# Patient Record
Sex: FEMALE | Race: AMERICAN INDIAN OR ALASKA NATIVE | NOT HISPANIC OR LATINO | Employment: UNEMPLOYED | URBAN - METROPOLITAN AREA
[De-identification: names, ages, dates, MRNs, and addresses within clinical notes are randomized per-mention and may not be internally consistent; named-entity substitution may affect disease eponyms.]

---

## 2023-03-13 PROBLEM — N85.01 ENDOMETRIAL HYPERPLASIA WITHOUT ATYPIA, COMPLEX: Status: ACTIVE | Noted: 2023-03-13

## 2023-03-17 ENCOUNTER — TELEPHONE (OUTPATIENT)
Dept: GYNECOLOGIC ONCOLOGY | Facility: CLINIC | Age: 43
End: 2023-03-17
Payer: MEDICAID

## 2023-03-17 NOTE — TELEPHONE ENCOUNTER
Spoke with our patient about her insurance, schedule appointment she voiced understanding of the date, time and location. All questions answered appointment mail. Provider Scheduling Coord.  Gynecologic Oncology MA/PAR /Preceptor Salomon Stewart

## 2023-03-20 PROBLEM — C54.1 ENDOMETRIAL CANCER: Status: ACTIVE | Noted: 2023-03-20

## 2023-03-20 NOTE — PROGRESS NOTES
Referring Provider:  Elysia Garrido MD  [address] [place] [id number]   Subjective:      Patient ID: Kristi Coronado is a 42 y.o. female.    Chief Complaint: Advice Only (New patient )    Problem List Items Addressed This Visit          Oncology    Endometrial cancer    Overview     23: EMB with fragmented endometrioid proliferation, probably representing endometrioid carcinoma (FIGO G1) and complex atypical hyperplasia. US with 12.7 mm EMS         Relevant Orders    Ambulatory referral/consult to Cardiology     Other Visit Diagnoses       History of MI (myocardial infarction)    -  Primary    Relevant Orders    Ambulatory referral/consult to Cardiology    Class 3 severe obesity with body mass index (BMI) of 40.0 to 44.9 in adult, unspecified obesity type, unspecified whether serious comorbidity present        Relevant Orders    Ambulatory referral/consult to Cardiology           HPI Reports long history of AUB. Recent CT for back pain showed thickened endometrial cavity. Subsequent US showed 12.7mm EMS.     Reports 150 lb intentional weight loss from her maximum weight.    Reports 3 heart attacks from 5252-9231 and underwent angiography but no stents. Not currently established with a cardiologist.     Review of Systems   Constitutional:  Negative for chills, fatigue and fever.   Respiratory:  Negative for cough and shortness of breath.    Cardiovascular:  Negative for chest pain.   Gastrointestinal:  Negative for abdominal distention, abdominal pain, constipation and diarrhea.   Genitourinary:  Positive for vaginal bleeding. Negative for dysuria and pelvic pain.   Musculoskeletal:  Negative for back pain.   Psychiatric/Behavioral:  Negative for dysphoric mood. The patient is not nervous/anxious.    Past Medical History:   Diagnosis Date    Back pain     Heart attack       Past Surgical History:   Procedure Laterality Date     SECTION      GALLBLADDER SURGERY      TUBAL LIGATION         History reviewed. No pertinent family history.   Social History     Socioeconomic History    Marital status: Single        Objective:      Vitals:    03/23/23 0953   BP: 126/71   Pulse: 89      Physical Exam  Constitutional:       General: She is not in acute distress.  HENT:      Head: Normocephalic.   Eyes:      Extraocular Movements: Extraocular movements intact.      Conjunctiva/sclera: Conjunctivae normal.   Cardiovascular:      Rate and Rhythm: Normal rate.      Pulses: Normal pulses.   Pulmonary:      Effort: Pulmonary effort is normal. No respiratory distress.      Breath sounds: No wheezing.   Abdominal:      General: There is no distension.      Tenderness: There is no abdominal tenderness. There is no guarding or rebound.      Comments: Pfannenstiel scar and laparoscopic scars from cholecystectomy   Genitourinary:     Comments: External genitalia normal. Vagina normal. Cervix with no visible lesions. Uterus mobile.  Exam limited by body habitus.     Musculoskeletal:         General: No deformity.   Neurological:      Mental Status: She is alert and oriented to person, place, and time.   Psychiatric:         Mood and Affect: Mood normal.         Behavior: Behavior normal.         Thought Content: Thought content normal.       No results found for: WBC, HGB, HCT, MCV, PLT     Assessment:       History of MI (myocardial infarction)  -     Ambulatory referral/consult to Cardiology; Future; Expected date: 03/30/2023    Endometrial cancer  -     Ambulatory referral/consult to Cardiology; Future; Expected date: 03/30/2023    Class 3 severe obesity with body mass index (BMI) of 40.0 to 44.9 in adult, unspecified obesity type, unspecified whether serious comorbidity present  -     Ambulatory referral/consult to Cardiology; Future; Expected date: 03/30/2023         Plan:       Endometrial cancer: Prior work up showed EMB with G1 endometrioid carcinoma and CAH as well as a 12.7 mm EMS . I had an extensive  conversation with the patient today giving a broad overview of endometrial cancer to include epidemiology, risk factors, clinical features, diagnosis, as well as surgical treatment.  I have recommended robotic-assisted hysterectomy, bilateral salpingo-oophorectomy and sentinel lymph node mapping and biopsy. Discussed role of systematic lymphadenectomy if no mapping.  Based on the data from surgery we will determine whether adjuvant therapy would be recommended. I discussed extensively the risks, benefits, alternatives, and indications of the planned procedure to include the risk of damage to bowel, bladder, ureter, or any other abdominal or pelvic organ as well as the risks of conversion to a laparotomy. Additionally, she understands the surgical risks of infection, allergic reaction, bleeding possibly severe enough to require blood transfusion, blood clots, and cardiopulmonary complications.  She expresses understanding, was given an opportunity to ask questions. After all questions had been answered she strongly desires to proceed with planned procedure.  Plan for Robotic hysterectomy, BSO, and SLN biopsy  4/26    2. Morbid obesity: BMI 44. Reports past success with 150lb weight loss from max weight of 400 lbs. Reviewed the potentially causal relationship between obesity and CAH/endometrial cancer. Encouraged continued weight loss efforts.    3. History of myocardial infarction: urgent refer to cardiology to establish ongoing care and preoperative risk assessment and optimization with an effort to expedite evaluation and work up prior to planned surgery on 4/26.    As part of the medical decision making process I reviewed the referring provides notes, relevant labs, imaging reports. I spent 60 minutes of face to face counseling, care coordination, and records review related to today's encounter.    .      Prasad Hathaway MD

## 2023-03-23 ENCOUNTER — OFFICE VISIT (OUTPATIENT)
Dept: GYNECOLOGIC ONCOLOGY | Facility: CLINIC | Age: 43
End: 2023-03-23
Payer: MEDICAID

## 2023-03-23 ENCOUNTER — TELEPHONE (OUTPATIENT)
Dept: GYNECOLOGIC ONCOLOGY | Facility: CLINIC | Age: 43
End: 2023-03-23
Payer: MEDICAID

## 2023-03-23 VITALS
HEART RATE: 89 BPM | HEIGHT: 62 IN | BODY MASS INDEX: 44.99 KG/M2 | SYSTOLIC BLOOD PRESSURE: 126 MMHG | WEIGHT: 244.5 LBS | DIASTOLIC BLOOD PRESSURE: 71 MMHG

## 2023-03-23 DIAGNOSIS — N85.02 COMPLEX ENDOMETRIAL HYPERPLASIA WITH ATYPIA: ICD-10-CM

## 2023-03-23 DIAGNOSIS — I25.2 HISTORY OF MI (MYOCARDIAL INFARCTION): ICD-10-CM

## 2023-03-23 DIAGNOSIS — C54.1 ENDOMETRIAL CANCER: Primary | ICD-10-CM

## 2023-03-23 DIAGNOSIS — E66.01 CLASS 3 SEVERE OBESITY WITH BODY MASS INDEX (BMI) OF 40.0 TO 44.9 IN ADULT, UNSPECIFIED OBESITY TYPE, UNSPECIFIED WHETHER SERIOUS COMORBIDITY PRESENT: ICD-10-CM

## 2023-03-23 DIAGNOSIS — E66.01 CLASS 3 SEVERE OBESITY DUE TO EXCESS CALORIES WITH SERIOUS COMORBIDITY AND BODY MASS INDEX (BMI) OF 40.0 TO 44.9 IN ADULT: ICD-10-CM

## 2023-03-23 DIAGNOSIS — I25.2 HISTORY OF MYOCARDIAL INFARCTION: ICD-10-CM

## 2023-03-23 DIAGNOSIS — C54.1 ENDOMETRIAL CARCINOMA: ICD-10-CM

## 2023-03-23 PROBLEM — E66.813 CLASS 3 SEVERE OBESITY WITH BODY MASS INDEX (BMI) OF 40.0 TO 44.9 IN ADULT: Status: ACTIVE | Noted: 2023-03-23

## 2023-03-23 PROCEDURE — 99205 PR OFFICE/OUTPT VISIT, NEW, LEVL V, 60-74 MIN: ICD-10-PCS | Mod: S$PBB,,, | Performed by: STUDENT IN AN ORGANIZED HEALTH CARE EDUCATION/TRAINING PROGRAM

## 2023-03-23 PROCEDURE — 99999 PR PBB SHADOW E&M-EST. PATIENT-LVL III: ICD-10-PCS | Mod: PBBFAC,,, | Performed by: STUDENT IN AN ORGANIZED HEALTH CARE EDUCATION/TRAINING PROGRAM

## 2023-03-23 PROCEDURE — 99213 OFFICE O/P EST LOW 20 MIN: CPT | Mod: PBBFAC | Performed by: STUDENT IN AN ORGANIZED HEALTH CARE EDUCATION/TRAINING PROGRAM

## 2023-03-23 PROCEDURE — 3074F SYST BP LT 130 MM HG: CPT | Mod: CPTII,,, | Performed by: STUDENT IN AN ORGANIZED HEALTH CARE EDUCATION/TRAINING PROGRAM

## 2023-03-23 PROCEDURE — 99205 OFFICE O/P NEW HI 60 MIN: CPT | Mod: S$PBB,,, | Performed by: STUDENT IN AN ORGANIZED HEALTH CARE EDUCATION/TRAINING PROGRAM

## 2023-03-23 PROCEDURE — 99999 PR PBB SHADOW E&M-EST. PATIENT-LVL III: CPT | Mod: PBBFAC,,, | Performed by: STUDENT IN AN ORGANIZED HEALTH CARE EDUCATION/TRAINING PROGRAM

## 2023-03-23 PROCEDURE — 1159F PR MEDICATION LIST DOCUMENTED IN MEDICAL RECORD: ICD-10-PCS | Mod: CPTII,,, | Performed by: STUDENT IN AN ORGANIZED HEALTH CARE EDUCATION/TRAINING PROGRAM

## 2023-03-23 PROCEDURE — 3008F BODY MASS INDEX DOCD: CPT | Mod: CPTII,,, | Performed by: STUDENT IN AN ORGANIZED HEALTH CARE EDUCATION/TRAINING PROGRAM

## 2023-03-23 PROCEDURE — 3008F PR BODY MASS INDEX (BMI) DOCUMENTED: ICD-10-PCS | Mod: CPTII,,, | Performed by: STUDENT IN AN ORGANIZED HEALTH CARE EDUCATION/TRAINING PROGRAM

## 2023-03-23 PROCEDURE — 3074F PR MOST RECENT SYSTOLIC BLOOD PRESSURE < 130 MM HG: ICD-10-PCS | Mod: CPTII,,, | Performed by: STUDENT IN AN ORGANIZED HEALTH CARE EDUCATION/TRAINING PROGRAM

## 2023-03-23 PROCEDURE — 1159F MED LIST DOCD IN RCRD: CPT | Mod: CPTII,,, | Performed by: STUDENT IN AN ORGANIZED HEALTH CARE EDUCATION/TRAINING PROGRAM

## 2023-03-23 PROCEDURE — 3078F DIAST BP <80 MM HG: CPT | Mod: CPTII,,, | Performed by: STUDENT IN AN ORGANIZED HEALTH CARE EDUCATION/TRAINING PROGRAM

## 2023-03-23 PROCEDURE — 3078F PR MOST RECENT DIASTOLIC BLOOD PRESSURE < 80 MM HG: ICD-10-PCS | Mod: CPTII,,, | Performed by: STUDENT IN AN ORGANIZED HEALTH CARE EDUCATION/TRAINING PROGRAM

## 2023-03-23 RX ORDER — CEFAZOLIN SODIUM 2 G/50ML
2 SOLUTION INTRAVENOUS
Status: CANCELLED | OUTPATIENT
Start: 2023-03-23

## 2023-03-23 RX ORDER — LIDOCAINE HYDROCHLORIDE 10 MG/ML
1 INJECTION, SOLUTION EPIDURAL; INFILTRATION; INTRACAUDAL; PERINEURAL ONCE
Status: CANCELLED | OUTPATIENT
Start: 2023-03-23 | End: 2023-03-23

## 2023-03-23 RX ORDER — PREDNISONE 20 MG/1
20 TABLET ORAL
COMMUNITY
Start: 2023-01-05 | End: 2023-04-14 | Stop reason: ALTCHOICE

## 2023-03-23 RX ORDER — TIZANIDINE 2 MG/1
2 TABLET ORAL 3 TIMES DAILY
COMMUNITY
Start: 2023-01-05 | End: 2023-04-14 | Stop reason: ALTCHOICE

## 2023-03-23 RX ORDER — HEPARIN SODIUM 5000 [USP'U]/ML
5000 INJECTION, SOLUTION INTRAVENOUS; SUBCUTANEOUS EVERY 8 HOURS
Status: CANCELLED | OUTPATIENT
Start: 2023-03-23

## 2023-03-23 RX ORDER — GABAPENTIN 300 MG/1
300 CAPSULE ORAL NIGHTLY
COMMUNITY
Start: 2023-01-09 | End: 2023-04-14 | Stop reason: ALTCHOICE

## 2023-03-23 RX ORDER — METHYLPREDNISOLONE 4 MG/1
TABLET ORAL
COMMUNITY
Start: 2023-01-09 | End: 2023-04-14 | Stop reason: ALTCHOICE

## 2023-03-23 RX ORDER — HYDROCODONE BITARTRATE AND ACETAMINOPHEN 7.5; 325 MG/1; MG/1
1 TABLET ORAL 3 TIMES DAILY
COMMUNITY
Start: 2023-01-24 | End: 2023-04-14 | Stop reason: ALTCHOICE

## 2023-03-23 RX ORDER — AMOXICILLIN 500 MG/1
500 CAPSULE ORAL 3 TIMES DAILY
COMMUNITY
Start: 2023-01-24 | End: 2023-04-14 | Stop reason: ALTCHOICE

## 2023-03-23 RX ORDER — HYDROCODONE BITARTRATE AND ACETAMINOPHEN 5; 325 MG/1; MG/1
1 TABLET ORAL 4 TIMES DAILY PRN
COMMUNITY
Start: 2022-12-03 | End: 2023-04-14 | Stop reason: ALTCHOICE

## 2023-03-23 NOTE — Clinical Note
Dr. Garrido,  Thanks for sending Kristi to see me. She is a wonderful lady and is truly such a pleasure. I'm planning for surgery in the near future after she connects with cardiology for a preop assessment. I'll be sure to keep you updated on her course.   Please feel free to reach out any time with patient questions or referrals and I will always work to try to get patients in as quickly as possible.  Prasad Hathaway MD Cell: [phone]

## 2023-04-11 ENCOUNTER — TELEPHONE (OUTPATIENT)
Dept: CARDIOLOGY | Facility: CLINIC | Age: 43
End: 2023-04-11
Payer: MEDICAID

## 2023-04-11 NOTE — TELEPHONE ENCOUNTER
Tried sevral times calling patient. Voice message stating patient is not accepting phone calls at this time.

## 2023-04-11 NOTE — TELEPHONE ENCOUNTER
----- Message from Lemuel Samano sent at 4/11/2023 12:20 PM CDT -----  When you get a chance can you schedule this patient with one of the docs?     ----- Message -----  From: Maliha Gomez  Sent: 4/11/2023  12:17 PM CDT  To: , #    Name of Who is Calling: RAMÍREZ DALEY [[id number]]           What is the request in detail: Pt stated that she has an referral but would like to be seen soon as she has an upcoming surgery and is needing clearance from an cardiologist.Please contact to further discuss and advise.            Can the clinic reply by [facility]: NO           What Number to Call Back if not in [facility]:[phone]

## 2023-04-13 ENCOUNTER — OFFICE VISIT (OUTPATIENT)
Dept: CARDIOLOGY | Facility: CLINIC | Age: 43
End: 2023-04-13
Payer: MEDICAID

## 2023-04-13 VITALS
HEART RATE: 91 BPM | BODY MASS INDEX: 44.63 KG/M2 | OXYGEN SATURATION: 97 % | WEIGHT: 242.5 LBS | HEIGHT: 62 IN | RESPIRATION RATE: 18 BRPM | DIASTOLIC BLOOD PRESSURE: 76 MMHG | SYSTOLIC BLOOD PRESSURE: 118 MMHG

## 2023-04-13 DIAGNOSIS — I25.2 HISTORY OF MI (MYOCARDIAL INFARCTION): ICD-10-CM

## 2023-04-13 DIAGNOSIS — E66.01 CLASS 3 SEVERE OBESITY WITH BODY MASS INDEX (BMI) OF 40.0 TO 44.9 IN ADULT, UNSPECIFIED OBESITY TYPE, UNSPECIFIED WHETHER SERIOUS COMORBIDITY PRESENT: ICD-10-CM

## 2023-04-13 DIAGNOSIS — I25.2 HISTORY OF MI (MYOCARDIAL INFARCTION): Primary | ICD-10-CM

## 2023-04-13 DIAGNOSIS — C54.1 ENDOMETRIAL CANCER: Primary | ICD-10-CM

## 2023-04-13 DIAGNOSIS — I25.2 HISTORY OF MYOCARDIAL INFARCTION DUE TO DEMAND ISCHEMIA: ICD-10-CM

## 2023-04-13 PROCEDURE — 3078F PR MOST RECENT DIASTOLIC BLOOD PRESSURE < 80 MM HG: ICD-10-PCS | Mod: CPTII,,, | Performed by: NURSE PRACTITIONER

## 2023-04-13 PROCEDURE — 93010 ELECTROCARDIOGRAM REPORT: CPT | Mod: S$PBB,,, | Performed by: INTERNAL MEDICINE

## 2023-04-13 PROCEDURE — 99204 PR OFFICE/OUTPT VISIT, NEW, LEVL IV, 45-59 MIN: ICD-10-PCS | Mod: S$PBB,,, | Performed by: NURSE PRACTITIONER

## 2023-04-13 PROCEDURE — 99204 OFFICE O/P NEW MOD 45 MIN: CPT | Mod: S$PBB,,, | Performed by: NURSE PRACTITIONER

## 2023-04-13 PROCEDURE — 3008F BODY MASS INDEX DOCD: CPT | Mod: CPTII,,, | Performed by: NURSE PRACTITIONER

## 2023-04-13 PROCEDURE — 1159F MED LIST DOCD IN RCRD: CPT | Mod: CPTII,,, | Performed by: NURSE PRACTITIONER

## 2023-04-13 PROCEDURE — 99999 PR PBB SHADOW E&M-EST. PATIENT-LVL III: ICD-10-PCS | Mod: PBBFAC,,, | Performed by: NURSE PRACTITIONER

## 2023-04-13 PROCEDURE — 3078F DIAST BP <80 MM HG: CPT | Mod: CPTII,,, | Performed by: NURSE PRACTITIONER

## 2023-04-13 PROCEDURE — 3074F PR MOST RECENT SYSTOLIC BLOOD PRESSURE < 130 MM HG: ICD-10-PCS | Mod: CPTII,,, | Performed by: NURSE PRACTITIONER

## 2023-04-13 PROCEDURE — 3008F PR BODY MASS INDEX (BMI) DOCUMENTED: ICD-10-PCS | Mod: CPTII,,, | Performed by: NURSE PRACTITIONER

## 2023-04-13 PROCEDURE — 93010 EKG 12-LEAD: ICD-10-PCS | Mod: S$PBB,,, | Performed by: INTERNAL MEDICINE

## 2023-04-13 PROCEDURE — 3074F SYST BP LT 130 MM HG: CPT | Mod: CPTII,,, | Performed by: NURSE PRACTITIONER

## 2023-04-13 PROCEDURE — 99213 OFFICE O/P EST LOW 20 MIN: CPT | Mod: PBBFAC | Performed by: NURSE PRACTITIONER

## 2023-04-13 PROCEDURE — 1159F PR MEDICATION LIST DOCUMENTED IN MEDICAL RECORD: ICD-10-PCS | Mod: CPTII,,, | Performed by: NURSE PRACTITIONER

## 2023-04-13 PROCEDURE — 93005 ELECTROCARDIOGRAM TRACING: CPT | Mod: PBBFAC | Performed by: INTERNAL MEDICINE

## 2023-04-13 PROCEDURE — 99999 PR PBB SHADOW E&M-EST. PATIENT-LVL III: CPT | Mod: PBBFAC,,, | Performed by: NURSE PRACTITIONER

## 2023-04-13 NOTE — PROGRESS NOTES
[facility]    CC: Medical clearance/ Establish care    Patient ID: Kristi Coronado is a 42 y.o. female with a past medical history of MI.      HPI  Patient reports extensive cardiac history of 3 heart attacks from 9866-0423 and underwent angiography but did not require stenting   No medical records per review    She tells me she discontinued all recommended cardiac medication, including statin, BB, plavix, ACE-I; states she feels better when she does not take medications   Tells me she has stress testing in the past  Continues to smoke  No active    Has upcoming non-cardiac procedure scheduled  for hysterectomy for endometrial CA    Reports chronic chest pain, unchanged in nature since   Denies syncope, pre-syncope, orthopnea, PND, palpitations     Past Medical History:   Diagnosis Date    Back pain     Heart attack      Past Surgical History:   Procedure Laterality Date     SECTION      GALLBLADDER SURGERY      TUBAL LIGATION       Social History     Socioeconomic History    Marital status: Single   Tobacco Use    Smoking status: Former     Packs/day: 1.50     Years: 15.00     Pack years: 22.50     Types: Cigarettes   Substance and Sexual Activity    Alcohol use: Never    Drug use: Never    Sexual activity: Yes     Partners: Male     Birth control/protection: None     No family history on file.    Review of patient's allergies indicates:  No Known Allergies    Medication List with Changes/Refills   Current Medications    AMOXICILLIN (AMOXIL) 500 MG CAPSULE    Take 500 mg by mouth 3 (three) times daily.    GABAPENTIN (NEURONTIN) 300 MG CAPSULE    Take 300 mg by mouth every evening.    HYDROCODONE-ACETAMINOPHEN (NORCO) 5-325 MG PER TABLET    Take 1 tablet by mouth 4 (four) times daily as needed.    HYDROCODONE-ACETAMINOPHEN (NORCO) 7.5-325 MG PER TABLET    Take 1 tablet by mouth 3 (three) times daily.    METHYLPREDNISOLONE (MEDROL DOSEPACK) 4 MG TABLET    Take by mouth.     "PREDNISONE (DELTASONE) 20 MG TABLET    Take 20 mg by mouth.    TIZANIDINE (ZANAFLEX) 2 MG TABLET    Take 2 mg by mouth 3 (three) times daily.           Review of Systems   Constitutional: Negative.   Cardiovascular: Negative.    Respiratory: Negative.     Skin: Negative.    Musculoskeletal:  Positive for muscle weakness.     Vitals:    04/13/23 1056   BP: 118/76   Pulse: 91   Resp: 18   SpO2: 97%   Weight: 110 kg (242 lb 8.1 oz)   Height: 5' 2" (1.575 m)          Physical Exam  Constitutional:       Appearance: She is obese.   HENT:      Head: Normocephalic.   Cardiovascular:      Rate and Rhythm: Normal rate and regular rhythm.      Pulses: Normal pulses.      Heart sounds: Normal heart sounds.   Pulmonary:      Effort: Pulmonary effort is normal.      Breath sounds: Normal breath sounds.   Musculoskeletal:         General: Normal range of motion.   Skin:     General: Skin is warm and dry.   Neurological:      Mental Status: She is alert and oriented to person, place, and time.   Psychiatric:         Mood and Affect: Mood normal.         Labs:  Most Recent Data  CBC: No results found for: WBC, HGB, HCT, PLT, MCV, RDW  BMP: No results found for: NA, K, CL, CO2, BUN, CREATININE, GLU, CALCIUM, MG, PHOS  LFTS; No results found for: PROT, ALBUMIN, BILITOT, AST, ALKPHOS, ALT, GGT  COAGS: No results found for: INR, PROTIME, PTT  FLP: No results found for: CHOL, HDL, LDLCALC, TRIG, CHOLHDL  CARDIAC: No results found for: TROPONINI, CKMB, BNP    Assessment/Plan:  Problem List Items Addressed This Visit          Oncology    Endometrial cancer    Overview     2/23/23: EMB with fragmented endometrioid proliferation, probably representing endometrioid carcinoma (FIGO G1) and complex atypical hyperplasia. US with 12.7 mm EMS              Endocrine    Class 3 severe obesity with body mass index (BMI) of 40.0 to 44.9 in adult     Other Visit Diagnoses       History of MI (myocardial infarction)              Asymptomatic   EKG NSR, " no infarct noted, no ST changes   Stable from cardiac standpoint given today's exam, however given history would like medical records prior to clearance.     Patient reports recent, extensive cardiac workup including angiogram from [facility]-     Please get medical records from [facility] and [facility]   Will complete clearance thereafter    Encourage smoke cessation           ADDENDUM 4/25/2023  updated non-obstructive CAD, 30% stenosing to ramus and LAD, EF normal, grade 1 DD  Hx NSTEMI type II 2021- trop 1.0    Aggressive medical mgt  Was discharged with ASA, Lipitor 40, lopressor 12.5 QID, lisinopril 5mg per [facility] records; patient stopped all meds as she reports they were making her feel bad; will reassess at next visit    Patient is at acceptable risk for upcoming noncardiac surgery. Able to preform a min of 4 METS      Total duration of face to face visit time 30 minutes.  Total time spent counseling greater than fifty percent of total visit time.  Counseling included discussion regarding imaging findings, diagnosis, possibilities, treatment options, risks and benefits.  The patient had many questions regarding the options and long-term effects.    Mena Guthrie, EDGARDOP-C  Cardiology Clinic  [facility]

## 2023-04-14 ENCOUNTER — TELEPHONE (OUTPATIENT)
Dept: CARDIOLOGY | Facility: CLINIC | Age: 43
End: 2023-04-14
Payer: MEDICAID

## 2023-04-14 NOTE — TELEPHONE ENCOUNTER
----- Message from Mena Guthrie, NP sent at 4/14/2023  2:47 PM CDT -----  Kailash Augustine,     Can we please follow up on her medical release from [facility] and [facility]?    Mena Guthrie

## 2023-04-18 ENCOUNTER — HOSPITAL ENCOUNTER (OUTPATIENT)
Dept: PREADMISSION TESTING | Facility: OTHER | Age: 43
Discharge: HOME | End: 2023-04-18
Attending: STUDENT IN AN ORGANIZED HEALTH CARE EDUCATION/TRAINING PROGRAM
Payer: MEDICAID

## 2023-04-18 ENCOUNTER — ANESTHESIA EVENT (OUTPATIENT)
Dept: SURGERY | Facility: OTHER | Age: 43
End: 2023-04-18
Payer: MEDICAID

## 2023-04-18 VITALS
DIASTOLIC BLOOD PRESSURE: 84 MMHG | SYSTOLIC BLOOD PRESSURE: 137 MMHG | OXYGEN SATURATION: 96 % | HEART RATE: 89 BPM | HEIGHT: 62 IN | BODY MASS INDEX: 44.53 KG/M2 | WEIGHT: 242 LBS

## 2023-04-18 DIAGNOSIS — I25.2 HISTORY OF MI (MYOCARDIAL INFARCTION): ICD-10-CM

## 2023-04-18 DIAGNOSIS — C54.1 ENDOMETRIAL CANCER: ICD-10-CM

## 2023-04-18 DIAGNOSIS — E66.01 CLASS 3 SEVERE OBESITY WITH BODY MASS INDEX (BMI) OF 40.0 TO 44.9 IN ADULT, UNSPECIFIED OBESITY TYPE, UNSPECIFIED WHETHER SERIOUS COMORBIDITY PRESENT: ICD-10-CM

## 2023-04-18 LAB
ABO + RH BLD: NORMAL
ALBUMIN SERPL BCP-MCNC: 4 G/DL (ref 3.5–5.2)
ALP SERPL-CCNC: 76 U/L (ref 55–135)
ALT SERPL W/O P-5'-P-CCNC: 18 U/L (ref 10–44)
ANION GAP SERPL CALC-SCNC: 8 MMOL/L (ref 8–16)
AST SERPL-CCNC: 16 U/L (ref 10–40)
BASOPHILS # BLD AUTO: 0.05 K/UL (ref 0–0.2)
BASOPHILS NFR BLD: 0.3 % (ref 0–1.9)
BILIRUB SERPL-MCNC: 0.2 MG/DL (ref 0.1–1)
BLD GP AB SCN CELLS X3 SERPL QL: NORMAL
BUN SERPL-MCNC: 11 MG/DL (ref 6–20)
CALCIUM SERPL-MCNC: 10.3 MG/DL (ref 8.7–10.5)
CHLORIDE SERPL-SCNC: 102 MMOL/L (ref 95–110)
CO2 SERPL-SCNC: 25 MMOL/L (ref 23–29)
CREAT SERPL-MCNC: 0.7 MG/DL (ref 0.5–1.4)
DIFFERENTIAL METHOD: ABNORMAL
EOSINOPHIL # BLD AUTO: 0.7 K/UL (ref 0–0.5)
EOSINOPHIL NFR BLD: 4.4 % (ref 0–8)
ERYTHROCYTE [DISTWIDTH] IN BLOOD BY AUTOMATED COUNT: 12.8 % (ref 11.5–14.5)
EST. GFR  (NO RACE VARIABLE): >60 ML/MIN/1.73 M^2
GLUCOSE SERPL-MCNC: 169 MG/DL (ref 70–110)
HCT VFR BLD AUTO: 48.5 % (ref 37–48.5)
HGB BLD-MCNC: 16.1 G/DL (ref 12–16)
IMM GRANULOCYTES # BLD AUTO: 0.05 K/UL (ref 0–0.04)
IMM GRANULOCYTES NFR BLD AUTO: 0.3 % (ref 0–0.5)
LYMPHOCYTES # BLD AUTO: 4.3 K/UL (ref 1–4.8)
LYMPHOCYTES NFR BLD: 28.6 % (ref 18–48)
MCH RBC QN AUTO: 29.7 PG (ref 27–31)
MCHC RBC AUTO-ENTMCNC: 33.2 G/DL (ref 32–36)
MCV RBC AUTO: 89 FL (ref 82–98)
MONOCYTES # BLD AUTO: 0.9 K/UL (ref 0.3–1)
MONOCYTES NFR BLD: 6.1 % (ref 4–15)
NEUTROPHILS # BLD AUTO: 9.2 K/UL (ref 1.8–7.7)
NEUTROPHILS NFR BLD: 60.3 % (ref 38–73)
NRBC BLD-RTO: 0 /100 WBC
PLATELET # BLD AUTO: 371 K/UL (ref 150–450)
PMV BLD AUTO: 9.3 FL (ref 9.2–12.9)
POTASSIUM SERPL-SCNC: 4.8 MMOL/L (ref 3.5–5.1)
PROT SERPL-MCNC: 7.7 G/DL (ref 6–8.4)
RBC # BLD AUTO: 5.43 M/UL (ref 4–5.4)
SODIUM SERPL-SCNC: 135 MMOL/L (ref 136–145)
SPECIMEN OUTDATE: NORMAL
WBC # BLD AUTO: 15.2 K/UL (ref 3.9–12.7)

## 2023-04-18 PROCEDURE — 85025 COMPLETE CBC W/AUTO DIFF WBC: CPT | Performed by: STUDENT IN AN ORGANIZED HEALTH CARE EDUCATION/TRAINING PROGRAM

## 2023-04-18 PROCEDURE — 80053 COMPREHEN METABOLIC PANEL: CPT | Performed by: STUDENT IN AN ORGANIZED HEALTH CARE EDUCATION/TRAINING PROGRAM

## 2023-04-18 PROCEDURE — 36415 COLL VENOUS BLD VENIPUNCTURE: CPT | Performed by: STUDENT IN AN ORGANIZED HEALTH CARE EDUCATION/TRAINING PROGRAM

## 2023-04-18 PROCEDURE — 86900 BLOOD TYPING SEROLOGIC ABO: CPT | Performed by: STUDENT IN AN ORGANIZED HEALTH CARE EDUCATION/TRAINING PROGRAM

## 2023-04-18 RX ORDER — LIDOCAINE HYDROCHLORIDE 10 MG/ML
1 INJECTION, SOLUTION EPIDURAL; INFILTRATION; INTRACAUDAL; PERINEURAL ONCE
Status: CANCELLED | OUTPATIENT
Start: 2023-04-18 | End: 2023-04-18

## 2023-04-18 RX ORDER — ALBUTEROL SULFATE 2.5 MG/.5ML
2.5 SOLUTION RESPIRATORY (INHALATION)
Status: CANCELLED | OUTPATIENT
Start: 2023-04-18 | End: 2023-04-18

## 2023-04-18 NOTE — ANESTHESIA PREPROCEDURE EVALUATION
04/18/2023  Silvana Coronado is a 42 y.o., female.      Pre-op Assessment    I have reviewed the Patient Summary Reports.     I have reviewed the Nursing Notes. I have reviewed the NPO Status.   I have reviewed the Medications.     Review of Systems  Anesthesia Hx:  No previous Anesthesia  Denies Family Hx of Anesthesia complications.   Denies Personal Hx of Anesthesia complications.   Social:  Smoker    Hematology/Oncology:  Hematology Normal      Current/Recent Cancer. Oncology Comments: Endometrial cancer    EENT/Dental:EENT/Dental Normal   Cardiovascular:   Past MI CAD   ECG has been reviewed. MI 2021,no stents  EKG NSR   Pulmonary:   Asthma asymptomatic    Renal/:  Renal/ Normal     Hepatic/GI:  Hepatic/GI Normal    Musculoskeletal:   Arthritis     Neurological:  Neurology Normal    Endocrine:  Endocrine Normal  Morbid Obesity / BMI > 40  Dermatological:  Skin Normal    Psych:  Psychiatric Normal           Physical Exam  General: Cooperative, Alert and Oriented    Airway:  Mallampati: II   Mouth Opening: Normal    Dental:  Periodontal disease  Missing teeth      Anesthesia Plan  Type of Anesthesia, risks & benefits discussed:    Anesthesia Type: Gen ETT  Intra-op Monitoring Plan: Standard ASA Monitors  Post Op Pain Control Plan: multimodal analgesia  Induction:  IV  Airway Plan: Video, Post-Induction  Informed Consent: Informed consent signed with the Patient and all parties understand the risks and agree with anesthesia plan.  All questions answered.   ASA Score: 3  Anesthesia Plan Notes: Awaiting cardiac clearance currently!  Pt still smoking in spite of CAD  Patient cleared by cardiology  NP in [facility]    Ready For Surgery From Anesthesia Perspective.     .

## 2023-04-24 ENCOUNTER — TELEPHONE (OUTPATIENT)
Dept: GYNECOLOGIC ONCOLOGY | Facility: CLINIC | Age: 43
End: 2023-04-24
Payer: MEDICAID

## 2023-04-24 NOTE — TELEPHONE ENCOUNTER
----- Message from Cyndi Wei sent at 4/24/2023  2:19 PM CDT -----  Contact: PAPO DALEY [[id number]]  Type: Call Back      Who called: PAPO DALEY [[id number]]      What is the request in detail: Patient is requesting a call back. Pt would like to know if her records has been received from [facility].  Please advise.     Can the clinic reply by [facility]? No      Would the patient rather a call back or a response via [facility]? Call back       Best call back number: [phone] (home)       Additional Information:

## 2023-04-25 DIAGNOSIS — E11.69 TYPE 2 DIABETES MELLITUS WITH OTHER SPECIFIED COMPLICATION, UNSPECIFIED WHETHER LONG TERM INSULIN USE: ICD-10-CM

## 2023-04-25 DIAGNOSIS — F17.200 CURRENT SMOKER: ICD-10-CM

## 2023-04-25 DIAGNOSIS — I25.2 HX OF NON-ST ELEVATION MYOCARDIAL INFARCTION (NSTEMI): Primary | ICD-10-CM

## 2023-04-25 NOTE — PROGRESS NOTES
Received medical records from [facility].    EKG 4/2023 NSR, no acute ST changes, no remote MI  [facility] 2021 non-obstructive disease  Hx NSTEMI  Echo with normal EF, grade 1 DD    Stable from cardiac standpoint and HF perspective. BP at goal.     Able to perform a min 4 METS. Patient at acceptable risk for upcoming non-cardiac procedure. She may follow up in clinic as scheduled.    MARTINA Guthrie NP

## 2023-04-26 ENCOUNTER — HOSPITAL ENCOUNTER (OUTPATIENT)
Facility: OTHER | Age: 43
LOS: 1 days | Discharge: HOME | End: 2023-04-26
Attending: STUDENT IN AN ORGANIZED HEALTH CARE EDUCATION/TRAINING PROGRAM | Admitting: STUDENT IN AN ORGANIZED HEALTH CARE EDUCATION/TRAINING PROGRAM
Payer: MEDICAID

## 2023-04-26 ENCOUNTER — ANESTHESIA (OUTPATIENT)
Dept: SURGERY | Facility: OTHER | Age: 43
End: 2023-04-26
Payer: MEDICAID

## 2023-04-26 DIAGNOSIS — C54.1 ENDOMETRIAL CARCINOMA: ICD-10-CM

## 2023-04-26 DIAGNOSIS — Z90.710 HISTORY OF ROBOT-ASSISTED LAPAROSCOPIC HYSTERECTOMY: Primary | ICD-10-CM

## 2023-04-26 DIAGNOSIS — I25.2 HISTORY OF MI (MYOCARDIAL INFARCTION): ICD-10-CM

## 2023-04-26 DIAGNOSIS — C54.1 ENDOMETRIAL CANCER: ICD-10-CM

## 2023-04-26 DIAGNOSIS — E66.01 CLASS 3 SEVERE OBESITY WITH BODY MASS INDEX (BMI) OF 40.0 TO 44.9 IN ADULT, UNSPECIFIED OBESITY TYPE, UNSPECIFIED WHETHER SERIOUS COMORBIDITY PRESENT: ICD-10-CM

## 2023-04-26 DIAGNOSIS — Z01.818 PRE-OP TESTING: ICD-10-CM

## 2023-04-26 LAB
B-HCG UR QL: NEGATIVE
CTP QC/QA: YES

## 2023-04-26 PROCEDURE — 71000039 HC RECOVERY, EACH ADD'L HOUR: Performed by: STUDENT IN AN ORGANIZED HEALTH CARE EDUCATION/TRAINING PROGRAM

## 2023-04-26 PROCEDURE — 38570 PR LAP,LYMPH NODE BX: ICD-10-PCS | Mod: AS,51,, | Performed by: NURSE PRACTITIONER

## 2023-04-26 PROCEDURE — 38900 IO MAP OF SENT LYMPH NODE: CPT | Mod: AS,50,, | Performed by: NURSE PRACTITIONER

## 2023-04-26 PROCEDURE — 36000713 HC OR TIME LEV V EA ADD 15 MIN: Performed by: STUDENT IN AN ORGANIZED HEALTH CARE EDUCATION/TRAINING PROGRAM

## 2023-04-26 PROCEDURE — 00840 ANES IPER PX LOWER ABD NOS: CPT | Performed by: STUDENT IN AN ORGANIZED HEALTH CARE EDUCATION/TRAINING PROGRAM

## 2023-04-26 PROCEDURE — 38900 PR INTRAOPERATIVE SENTINEL LYMPH NODE ID W DYE INJECTION: ICD-10-PCS | Mod: 50,,, | Performed by: STUDENT IN AN ORGANIZED HEALTH CARE EDUCATION/TRAINING PROGRAM

## 2023-04-26 PROCEDURE — 36000712 HC OR TIME LEV V 1ST 15 MIN: Performed by: STUDENT IN AN ORGANIZED HEALTH CARE EDUCATION/TRAINING PROGRAM

## 2023-04-26 PROCEDURE — 37000008 HC ANESTHESIA 1ST 15 MINUTES: Performed by: STUDENT IN AN ORGANIZED HEALTH CARE EDUCATION/TRAINING PROGRAM

## 2023-04-26 PROCEDURE — 27201423 OPTIME MED/SURG SUP & DEVICES STERILE SUPPLY: Performed by: STUDENT IN AN ORGANIZED HEALTH CARE EDUCATION/TRAINING PROGRAM

## 2023-04-26 PROCEDURE — 71000016 HC POSTOP RECOV ADDL HR: Performed by: STUDENT IN AN ORGANIZED HEALTH CARE EDUCATION/TRAINING PROGRAM

## 2023-04-26 PROCEDURE — 38570 LAPAROSCOPY LYMPH NODE BIOP: CPT | Mod: AS,51,, | Performed by: NURSE PRACTITIONER

## 2023-04-26 PROCEDURE — 25000003 PHARM REV CODE 250: Performed by: ANESTHESIOLOGY

## 2023-04-26 PROCEDURE — 58573 TLH W/T/O UTERUS OVER 250 G: CPT | Mod: AS,,, | Performed by: NURSE PRACTITIONER

## 2023-04-26 PROCEDURE — 38900 IO MAP OF SENT LYMPH NODE: CPT | Mod: 50,,, | Performed by: STUDENT IN AN ORGANIZED HEALTH CARE EDUCATION/TRAINING PROGRAM

## 2023-04-26 PROCEDURE — D9220A PRA ANESTHESIA: Mod: ANES,,, | Performed by: ANESTHESIOLOGY

## 2023-04-26 PROCEDURE — D9220A PRA ANESTHESIA: ICD-10-PCS | Mod: ANES,,, | Performed by: ANESTHESIOLOGY

## 2023-04-26 PROCEDURE — D9220A PRA ANESTHESIA: Mod: CRNA,,, | Performed by: NURSE ANESTHETIST, CERTIFIED REGISTERED

## 2023-04-26 PROCEDURE — 58573 TLH W/T/O UTERUS OVER 250 G: CPT | Mod: ,,, | Performed by: STUDENT IN AN ORGANIZED HEALTH CARE EDUCATION/TRAINING PROGRAM

## 2023-04-26 PROCEDURE — 63600175 PHARM REV CODE 636 W HCPCS: Performed by: STUDENT IN AN ORGANIZED HEALTH CARE EDUCATION/TRAINING PROGRAM

## 2023-04-26 PROCEDURE — 38900 PR INTRAOPERATIVE SENTINEL LYMPH NODE ID W DYE INJECTION: ICD-10-PCS | Mod: AS,50,, | Performed by: NURSE PRACTITIONER

## 2023-04-26 PROCEDURE — 63600175 PHARM REV CODE 636 W HCPCS

## 2023-04-26 PROCEDURE — 63600175 PHARM REV CODE 636 W HCPCS: Performed by: ANESTHESIOLOGY

## 2023-04-26 PROCEDURE — 58573 PR LAPAROSCOPY TOT HYSTERECTOMY UTERUS >250 GRAM W TUBE/OVARY: ICD-10-PCS | Mod: AS,,, | Performed by: NURSE PRACTITIONER

## 2023-04-26 PROCEDURE — P9045 ALBUMIN (HUMAN), 5%, 250 ML: HCPCS | Mod: JZ,JG | Performed by: NURSE ANESTHETIST, CERTIFIED REGISTERED

## 2023-04-26 PROCEDURE — 63600175 PHARM REV CODE 636 W HCPCS: Performed by: NURSE ANESTHETIST, CERTIFIED REGISTERED

## 2023-04-26 PROCEDURE — 81025 URINE PREGNANCY TEST: CPT | Performed by: ANESTHESIOLOGY

## 2023-04-26 PROCEDURE — 25000003 PHARM REV CODE 250: Performed by: NURSE ANESTHETIST, CERTIFIED REGISTERED

## 2023-04-26 PROCEDURE — 71000033 HC RECOVERY, INTIAL HOUR: Performed by: STUDENT IN AN ORGANIZED HEALTH CARE EDUCATION/TRAINING PROGRAM

## 2023-04-26 PROCEDURE — 25000242 PHARM REV CODE 250 ALT 637 W/ HCPCS: Performed by: ANESTHESIOLOGY

## 2023-04-26 PROCEDURE — 71000015 HC POSTOP RECOV 1ST HR: Performed by: STUDENT IN AN ORGANIZED HEALTH CARE EDUCATION/TRAINING PROGRAM

## 2023-04-26 PROCEDURE — D9220A PRA ANESTHESIA: ICD-10-PCS | Mod: CRNA,,, | Performed by: NURSE ANESTHETIST, CERTIFIED REGISTERED

## 2023-04-26 PROCEDURE — 58573 PR LAPAROSCOPY TOT HYSTERECTOMY UTERUS >250 GRAM W TUBE/OVARY: ICD-10-PCS | Mod: ,,, | Performed by: STUDENT IN AN ORGANIZED HEALTH CARE EDUCATION/TRAINING PROGRAM

## 2023-04-26 PROCEDURE — 38570 LAPAROSCOPY LYMPH NODE BIOP: CPT | Mod: 51,,, | Performed by: STUDENT IN AN ORGANIZED HEALTH CARE EDUCATION/TRAINING PROGRAM

## 2023-04-26 PROCEDURE — 38570 PR LAP,LYMPH NODE BX: ICD-10-PCS | Mod: 51,,, | Performed by: STUDENT IN AN ORGANIZED HEALTH CARE EDUCATION/TRAINING PROGRAM

## 2023-04-26 PROCEDURE — 37000009 HC ANESTHESIA EA ADD 15 MINS: Performed by: STUDENT IN AN ORGANIZED HEALTH CARE EDUCATION/TRAINING PROGRAM

## 2023-04-26 RX ORDER — HEPARIN SODIUM 5000 [USP'U]/ML
5000 INJECTION, SOLUTION INTRAVENOUS; SUBCUTANEOUS EVERY 8 HOURS
Status: DISCONTINUED | OUTPATIENT
Start: 2023-04-26 | End: 2023-04-26 | Stop reason: HOSPADM

## 2023-04-26 RX ORDER — OXYCODONE HYDROCHLORIDE 5 MG/1
5 TABLET ORAL
Status: DISCONTINUED | OUTPATIENT
Start: 2023-04-26 | End: 2023-04-26 | Stop reason: HOSPADM

## 2023-04-26 RX ORDER — LIDOCAINE HYDROCHLORIDE 10 MG/ML
1 INJECTION, SOLUTION EPIDURAL; INFILTRATION; INTRACAUDAL; PERINEURAL ONCE
Status: DISCONTINUED | OUTPATIENT
Start: 2023-04-26 | End: 2023-04-26 | Stop reason: HOSPADM

## 2023-04-26 RX ORDER — FENTANYL CITRATE 50 UG/ML
INJECTION, SOLUTION INTRAMUSCULAR; INTRAVENOUS
Status: DISCONTINUED | OUTPATIENT
Start: 2023-04-26 | End: 2023-04-26

## 2023-04-26 RX ORDER — ROCURONIUM BROMIDE 10 MG/ML
INJECTION, SOLUTION INTRAVENOUS
Status: DISCONTINUED | OUTPATIENT
Start: 2023-04-26 | End: 2023-04-26

## 2023-04-26 RX ORDER — SUCCINYLCHOLINE CHLORIDE 20 MG/ML INJECTION SOLUTION
SOLUTION
Status: DISCONTINUED | OUTPATIENT
Start: 2023-04-26 | End: 2023-04-26

## 2023-04-26 RX ORDER — HYDROMORPHONE HYDROCHLORIDE 2 MG/ML
0.4 INJECTION, SOLUTION INTRAMUSCULAR; INTRAVENOUS; SUBCUTANEOUS EVERY 5 MIN PRN
Status: DISCONTINUED | OUTPATIENT
Start: 2023-04-26 | End: 2023-04-26 | Stop reason: HOSPADM

## 2023-04-26 RX ORDER — IBUPROFEN 600 MG/1
600 TABLET ORAL EVERY 6 HOURS PRN
Qty: 60 TABLET | Refills: 1 | Status: ON HOLD | OUTPATIENT
Start: 2023-04-26 | End: 2023-05-08 | Stop reason: SDUPTHER

## 2023-04-26 RX ORDER — DEXMEDETOMIDINE HYDROCHLORIDE 100 UG/ML
INJECTION, SOLUTION INTRAVENOUS
Status: DISCONTINUED | OUTPATIENT
Start: 2023-04-26 | End: 2023-04-26

## 2023-04-26 RX ORDER — ALBUMIN HUMAN 50 G/1000ML
SOLUTION INTRAVENOUS CONTINUOUS PRN
Status: DISCONTINUED | OUTPATIENT
Start: 2023-04-26 | End: 2023-04-26

## 2023-04-26 RX ORDER — KETAMINE HCL IN 0.9 % NACL 50 MG/5 ML
SYRINGE (ML) INTRAVENOUS
Status: DISCONTINUED | OUTPATIENT
Start: 2023-04-26 | End: 2023-04-26

## 2023-04-26 RX ORDER — OXYCODONE AND ACETAMINOPHEN 5; 325 MG/1; MG/1
1 TABLET ORAL EVERY 4 HOURS PRN
Qty: 10 EACH | Refills: 0 | Status: ON HOLD | OUTPATIENT
Start: 2023-04-26 | End: 2023-05-08 | Stop reason: SDUPTHER

## 2023-04-26 RX ORDER — ACETAMINOPHEN 500 MG
500 TABLET ORAL EVERY 6 HOURS PRN
Qty: 60 TABLET | Refills: 1 | Status: SHIPPED | OUTPATIENT
Start: 2023-04-26

## 2023-04-26 RX ORDER — DOCUSATE SODIUM 100 MG/1
100 CAPSULE, LIQUID FILLED ORAL 2 TIMES DAILY PRN
Qty: 60 CAPSULE | Refills: 1 | Status: ON HOLD | OUTPATIENT
Start: 2023-04-26 | End: 2023-05-08 | Stop reason: HOSPADM

## 2023-04-26 RX ORDER — CEFAZOLIN SODIUM 1 G/3ML
INJECTION, POWDER, FOR SOLUTION INTRAMUSCULAR; INTRAVENOUS
Status: DISCONTINUED | OUTPATIENT
Start: 2023-04-26 | End: 2023-04-26

## 2023-04-26 RX ORDER — ALBUTEROL SULFATE 2.5 MG/.5ML
2.5 SOLUTION RESPIRATORY (INHALATION)
Status: COMPLETED | OUTPATIENT
Start: 2023-04-26 | End: 2023-04-26

## 2023-04-26 RX ORDER — PROCHLORPERAZINE EDISYLATE 5 MG/ML
5 INJECTION INTRAMUSCULAR; INTRAVENOUS EVERY 30 MIN PRN
Status: DISCONTINUED | OUTPATIENT
Start: 2023-04-26 | End: 2023-04-26 | Stop reason: HOSPADM

## 2023-04-26 RX ORDER — MEPERIDINE HYDROCHLORIDE 25 MG/ML
12.5 INJECTION INTRAMUSCULAR; INTRAVENOUS; SUBCUTANEOUS ONCE AS NEEDED
Status: DISCONTINUED | OUTPATIENT
Start: 2023-04-26 | End: 2023-04-26 | Stop reason: HOSPADM

## 2023-04-26 RX ORDER — DEXAMETHASONE SODIUM PHOSPHATE 4 MG/ML
INJECTION, SOLUTION INTRA-ARTICULAR; INTRALESIONAL; INTRAMUSCULAR; INTRAVENOUS; SOFT TISSUE
Status: DISCONTINUED | OUTPATIENT
Start: 2023-04-26 | End: 2023-04-26

## 2023-04-26 RX ORDER — SODIUM CHLORIDE 0.9 % (FLUSH) 0.9 %
3 SYRINGE (ML) INJECTION
Status: DISCONTINUED | OUTPATIENT
Start: 2023-04-26 | End: 2023-04-26 | Stop reason: HOSPADM

## 2023-04-26 RX ORDER — KETOROLAC TROMETHAMINE 30 MG/ML
INJECTION, SOLUTION INTRAMUSCULAR; INTRAVENOUS
Status: DISCONTINUED | OUTPATIENT
Start: 2023-04-26 | End: 2023-04-26

## 2023-04-26 RX ORDER — PROPOFOL 10 MG/ML
VIAL (ML) INTRAVENOUS
Status: DISCONTINUED | OUTPATIENT
Start: 2023-04-26 | End: 2023-04-26

## 2023-04-26 RX ORDER — ONDANSETRON 2 MG/ML
INJECTION INTRAMUSCULAR; INTRAVENOUS
Status: DISCONTINUED | OUTPATIENT
Start: 2023-04-26 | End: 2023-04-26

## 2023-04-26 RX ORDER — HEPARIN SODIUM 5000 [USP'U]/ML
5000 INJECTION, SOLUTION INTRAVENOUS; SUBCUTANEOUS EVERY 8 HOURS
Status: DISCONTINUED | OUTPATIENT
Start: 2023-04-26 | End: 2023-04-26

## 2023-04-26 RX ORDER — OXYCODONE HYDROCHLORIDE 5 MG/1
5 TABLET ORAL EVERY 4 HOURS PRN
Qty: 10 TABLET | Refills: 0 | Status: SHIPPED | OUTPATIENT
Start: 2023-04-26 | End: 2023-04-26 | Stop reason: HOSPADM

## 2023-04-26 RX ORDER — ACETAMINOPHEN 10 MG/ML
INJECTION, SOLUTION INTRAVENOUS
Status: DISCONTINUED | OUTPATIENT
Start: 2023-04-26 | End: 2023-04-26

## 2023-04-26 RX ORDER — LIDOCAINE HYDROCHLORIDE 20 MG/ML
INJECTION INTRAVENOUS
Status: DISCONTINUED | OUTPATIENT
Start: 2023-04-26 | End: 2023-04-26

## 2023-04-26 RX ADMIN — HEPARIN SODIUM 5000 UNITS: 5000 INJECTION INTRAVENOUS; SUBCUTANEOUS at 11:04

## 2023-04-26 RX ADMIN — OXYCODONE HYDROCHLORIDE 5 MG: 5 TABLET ORAL at 05:04

## 2023-04-26 RX ADMIN — HYDROMORPHONE HYDROCHLORIDE 0.4 MG: 2 INJECTION INTRAMUSCULAR; INTRAVENOUS; SUBCUTANEOUS at 03:04

## 2023-04-26 RX ADMIN — ROCURONIUM BROMIDE 20 MG: 10 INJECTION INTRAVENOUS at 01:04

## 2023-04-26 RX ADMIN — CEFAZOLIN 2 G: 1 INJECTION, POWDER, FOR SOLUTION INTRAMUSCULAR; INTRAVENOUS at 12:04

## 2023-04-26 RX ADMIN — ONDANSETRON 4 MG: 2 INJECTION INTRAMUSCULAR; INTRAVENOUS at 02:04

## 2023-04-26 RX ADMIN — ROCURONIUM BROMIDE 50 MG: 10 INJECTION INTRAVENOUS at 12:04

## 2023-04-26 RX ADMIN — DEXMEDETOMIDINE HYDROCHLORIDE 8 MCG: 100 INJECTION, SOLUTION INTRAVENOUS at 12:04

## 2023-04-26 RX ADMIN — PROPOFOL 70 MG: 10 INJECTION, EMULSION INTRAVENOUS at 02:04

## 2023-04-26 RX ADMIN — DEXMEDETOMIDINE HYDROCHLORIDE 12 MCG: 100 INJECTION, SOLUTION INTRAVENOUS at 12:04

## 2023-04-26 RX ADMIN — FENTANYL CITRATE 100 MCG: 0.05 INJECTION, SOLUTION INTRAMUSCULAR; INTRAVENOUS at 12:04

## 2023-04-26 RX ADMIN — ALBUTEROL SULFATE 2.5 MG: 2.5 SOLUTION RESPIRATORY (INHALATION) at 11:04

## 2023-04-26 RX ADMIN — SODIUM CHLORIDE, SODIUM LACTATE, POTASSIUM CHLORIDE, AND CALCIUM CHLORIDE: .6; .31; .03; .02 INJECTION, SOLUTION INTRAVENOUS at 12:04

## 2023-04-26 RX ADMIN — Medication 200 MG: at 12:04

## 2023-04-26 RX ADMIN — Medication 25 MG: at 12:04

## 2023-04-26 RX ADMIN — ACETAMINOPHEN 1000 MG: 10 INJECTION INTRAVENOUS at 02:04

## 2023-04-26 RX ADMIN — LIDOCAINE HYDROCHLORIDE 60 MG: 20 INJECTION, SOLUTION INTRAVENOUS at 12:04

## 2023-04-26 RX ADMIN — DEXAMETHASONE SODIUM PHOSPHATE 8 MG: 4 INJECTION, SOLUTION INTRAMUSCULAR; INTRAVENOUS at 12:04

## 2023-04-26 RX ADMIN — Medication 10 MG: at 01:04

## 2023-04-26 RX ADMIN — PROPOFOL 200 MG: 10 INJECTION, EMULSION INTRAVENOUS at 12:04

## 2023-04-26 RX ADMIN — KETOROLAC TROMETHAMINE 30 MG: 30 INJECTION, SOLUTION INTRAMUSCULAR; INTRAVENOUS at 02:04

## 2023-04-26 RX ADMIN — ALBUMIN (HUMAN): 12.5 SOLUTION INTRAVENOUS at 12:04

## 2023-04-26 NOTE — ANESTHESIA PROCEDURE NOTES
Intubation    Date/Time: 4/26/2023 12:29 PM  Performed by: Nicole Brown CRNA  Authorized by: Nicole Brown CRNA     Intubation:     Induction:  Rapid sequence induction    Intubated:  Postinduction    Mask Ventilation:  Not attempted    Attempts:  1    Attempted By:  CRNA    Method of Intubation:  Video laryngoscopy    Blade:  Ramsey 3    Laryngeal View Grade: Grade I - full view of cords      Difficult Airway Encountered?: No      Complications:  None    Airway Device:  Oral endotracheal tube    Airway Device Size:  8.0    Style/Cuff Inflation:  Cuffed (inflated to minimal occlusive pressure)    Inflation Amount (mL):  5    Tube secured:  22    Secured at:  The lips    Placement Verified By:  Capnometry    Complicating Factors:  Obesity    Findings Post-Intubation:  Atraumatic/condition of teeth unchanged and BS equal bilateral

## 2023-04-26 NOTE — DISCHARGE SUMMARY
[facility], [place] - Surgery ([place])  Brief Operative Note    Surgery Date: 4/26/2023     Surgeon(s) and Role:     * Prasad Rangel MD - Primary     * Nancy Lockhart MD - Resident - Assisting    Pre-op Diagnosis:  Endometrial cancer [C54.1]    Post-op Diagnosis:  Post-Op Diagnosis Codes:     * Endometrial cancer [C54.1]    Procedure(s) (LRB):  XI ROBOTIC HYSTERECTOMY (N/A)  XI ROBOTIC SALPINGO-OOPHORECTOMY (Bilateral)  MAPPING, LYMPH NODE, SENTINEL (N/A)  BIOPSY, LYMPH NODE (N/A)  XI ROBOTIC LYSIS, ADHESIONS (N/A)    Anesthesia: General    Operative Findings:   - TLH/BSO and sentinal LN dissection performed without complications  - See op note for further details    Estimated Blood Loss: 125 mL         Specimens:   Specimen (24h ago, onward)       Start     Ordered    04/26/23 1402  Specimen to Pathology, Surgery Gynecology and Obstetrics  Once        Comments: Pre-op Diagnosis: Endometrial cancer [C54.1]Procedure(s):XI ROBOTIC HYSTERECTOMYXI ROBOTIC SALPINGO-OOPHORECTOMYMAPPING, LYMPH NODE, SENTINELBIOPSY, LYMPH NODEXI ROBOTIC LYSIS, ADHESIONS Number of specimens: 3Name of specimens: 1. Left Pelvic [place] Lymph Node2. Right Pelvic [place] Node 3. Uterus, Cervix, bilateral tubes and ovaries     References:    Click here for ordering Quick Tip   Question Answer Comment   Procedure Type: Gynecology and Obstetrics    Which provider would you like to cc? PRASAD RANGEL    Release to patient Immediate        04/26/23 1406                      Discharge Note    OUTCOME: Patient tolerated treatment/procedure well without complication and is now ready for discharge.    DISPOSITION: Home or Self Care    FINAL DIAGNOSIS:  History of robot-assisted laparoscopic hysterectomy    FOLLOWUP: In clinic    DISCHARGE INSTRUCTIONS:    Discharge Procedure Orders   Diet Adult Regular     No driving until:   Order Comments: Able to safely slam on the breaks without pain and not taking narcotic pain medications     Pelvic Rest    Order Comments: Pelvic rest (nothing in the vagina) for 8 weeks.     Notify your health care provider if you experience any of the following:  temperature >100.4     Notify your health care provider if you experience any of the following:  persistent nausea and vomiting or diarrhea     Notify your health care provider if you experience any of the following:  severe uncontrolled pain     Notify your health care provider if you experience any of the following:  redness, tenderness, or signs of infection (pain, swelling, redness, odor or green/yellow discharge around incision site)     Notify your health care provider if you experience any of the following:  difficulty breathing or increased cough     Notify your health care provider if you experience any of the following:  severe persistent headache     Notify your health care provider if you experience any of the following:  worsening rash     Notify your health care provider if you experience any of the following:  persistent dizziness, light-headedness, or visual disturbances     Notify your health care provider if you experience any of the following:  increased confusion or weakness     Notify your health care provider if you experience any of the following:   Order Comments: Heavy vaginal bleeding saturating more than 1 pad per hour for at least 2 hours.     Activity as tolerated     Nancy Lockhart M.D.   OB/GYN  PGY-4

## 2023-04-26 NOTE — OPERATIVE NOTE ADDENDUM
Certification of Assistant at Surgery       Surgery Date: 4/26/2023     Participating Surgeons:  Surgeon(s) and Role:     * Prasad Hathaway MD - Primary     * Nancy Lockhart MD - Resident - Assisting    Procedures:  Procedure(s) (LRB):  XI ROBOTIC HYSTERECTOMY (N/A)  XI ROBOTIC SALPINGO-OOPHORECTOMY (Bilateral)  MAPPING, LYMPH NODE, SENTINEL (N/A)  BIOPSY, LYMPH NODE (N/A)  XI ROBOTIC LYSIS, ADHESIONS (N/A)    Assistant Surgeon's Certification of Necessity:  I understand that section 1842 (b) (6) (d) of the Social Security Act generally prohibits Medicare Part B reasonable charge payment for the services of assistants at surgery in teaching hospitals when qualified residents are available to furnish such services. I certify that the services for which payment is claimed were medically necessary, and that no qualified resident was available to perform the services. I further understand that these services are subject to post-payment review by the Medicare carrier.      Paula Rodrigez NP    04/26/2023  2:43 PM

## 2023-04-26 NOTE — PLAN OF CARE
Rosales catheter removed during procedure closing at 1432. Approximately 200 mL of urine was in drainage bag upon removal. Catheter tip intact

## 2023-04-26 NOTE — OP NOTE
DATE OF PROCEDURE:  4/26/23     SURGEON: Prasad Hathaway MD        ASSISTANTS: Paula Rodrigez who served as first assist  Sigifredo Lockhart MD       PREOPERATIVE DIAGNOSES: Grade 1 endometrial cancer, Morbid obesity     POSTOPERATIVE DIAGNOSES: Grade 1 endometrial cancer, Morbid obesity, intraabdominal adhesions     PROCEDURES:  Robotic-assisted laparoscopic hysterectomy and bilateral   salpingo-oophorectomy   Robotic assisted laparoscopic bilateral sentinel lymph node resection  Injection for sentinel lymph node mapping  Laparoscopic lysis of adhesions     COMPLICATIONS: None     ESTIMATED BLOOD LOSS: 100 cc     ANESTHESIA: GETA     INTRAOPERATIVE FINDINGS:  Normal appearing uterus and bilateral ovaries. Abscess pocket adjacent to uterus at right posterior portion of the vaginal cuff. Purulent fluid noted with creating colpotomy. Area entirely separate from sigmoid colon and right ureter.   Omental adhesions to the anterior abdominal wall in the midline. Dense adhesions from the bladder to the uterus.     PROCEDURE IN DETAIL: Informed consent was obtained and the patient was taken to   the Operating Suite.  General anesthesia was administered.  Once felt to be   adequate, she was placed in dorsal lithotomy position with her arms tucked.  The   abdomen and pelvis were prepped and draped in the usual fashion.  A Rosales catheter was placed to gravity drainage and a speculum was placed in the vagina.  The cervix was visualized, grasped with a single-tooth tenaculum and the uterus sounded to approximately 8 cm. ICG dye was injected into the cervix at 3 and 9 o'clock.  Serial dilation of cervix was performed and a VCare manipulator was placed without difficulty.   Attention was turned to the abdominal portion of the procedure. An 8 mm supra umbilical incision was made and a Veress needle was placed in the peritoneal cavity, confirmed by low opening pressure.  Pneumoperitoneum was obtained with carbon dioxide up to 15 mmHg  and a robotic trocar was placed through through the incision.  Intraperitoneal placement was confirmed with the camera.    Three additional robotic trocars were placed in the right and left mid clavicular lines and the left mid axillary line. A 5mm AirSeal port was placed in the right mid axillary line. All ports were placed under direct visualization. Abdominal survey revealed findings as above. Omental adhesions were taken down with a laparoscopic ligasure, taking care to avoid injury to the small bowel. The patient was placed in steep Trendelenburg and the bowel was retracted out of the pelvis.     The robot was docked and instruments were passed in the operative field.     Attention was turned to the right side. The retroperitoneum was opened parallel to the infundibulopelvic ligament. There paravesical and pararectal spaces were opened and a sentinel lymph node was identified in the obturator space. The ureter and obturator nerve were visualized and kept out of the field of dissection. The sentinel lymph node was grasped and resected and placed in a bag and removed through the robotic port. On the contralateral side, a similar procedure was performed. The sentinel lymph node was identified in the right obturator space. This was resected ensuring that the ureter and obturator nerve were out of the field of dissection.     The ureters were identified and a windows were created inferior to the ovarian vessels, ensuring that the ureters were out of the field of dissection. The ovarian vessels were cauterized and transected.     The posterior leaves of the broad ligament were dissected down to the level of the cup. The bilateral round ligaments and anterior leaves of the broad ligament were transected and the vesicouterine peritoneum was incised. Dense adhesions from the bladder to the anterior uterus were taken down. The bladder was reflected down below the level of the cup and an anterior colopotomy was created.  The uterine vessels were further skeletonized, cauterized, and transected, followed by cauterization and transection of the remaining cardinal ligaments. The colpotomy was extended circumferentially and the specimen was removed through the vagina. During the colpotomy, purulent fluid was noted as the colpotomy was traversing the right posterior aspect of the vagina. Inspection following specimen removal revealed an apparent small abscess pocket that was entirely separate from the colon and right ureter.     The cuff was then closed with a 2-0 V Lock suture.  The pelvis was irrigated and noted to be and hemostasis was achieved with cautery. Surgiflo was applied to the pelvis and bilateral retroperitoneal spaces.  Once hemostasis was confirmed, the instruments were removed, the robot was undocked and the pneumoperitoneum was evacuated.  The patient was flattened.  All ports were removed and port sites were inspected and made hemostatic with electrocautery and closed with subcuticular 4-0 Monocryl suture and dermabond. The patient was awoken and taken to Recovery Room in stable condition.  I was present for and performed all key aspects of procedure.      Paula Rodrigez's expertise was needed as there was no qualified   resident available.    Due to persistent difficulty maintaining safe visibility and significant effort to revisualize key structures often throughout the surgery, presence of significant omental adhesions to the anterior abdominal wall, and dense bladder adhesions to the uterus this procedure took approximately twice as long as normal.      Prasad Hathaway MD

## 2023-04-26 NOTE — H&P
Here for surgery. No new symptoms.  Denies changes in medical or surgical history.  Proceed with RA-TLH, BSO, SLN for endometrial cancer as planned.    See additional details and counseling below from 3/23 office visit.    Referring Provider:  Prasad Hathaway MD  [address]  [facility] [address]  [place] [address]   Subjective:      Patient ID: Silvana Coronado is a 42 y.o. female.    Chief Complaint: No chief complaint on file.    Problem List Items Addressed This Visit          Oncology    Endometrial cancer    Overview     2/23/23: EMB with fragmented endometrioid proliferation, probably representing endometrioid carcinoma (FIGO G1) and complex atypical hyperplasia. US with 12.7 mm EMS         Relevant Medications    LIDOcaine (PF) 10 mg/ml (1%) injection 10 mg    ceFAZolin 2 g in dextrose 5 % in water (D5W) 5 % 50 mL IVPB (MB+)    heparin (porcine) injection 5,000 Units    Other Relevant Orders    Place in Outpatient (Completed)    Vital signs    Insert peripheral IV    Verify beta-blocker dose taken within 24 hours if patient is prescribed beta-blocker    Verify discontinuation of anti thrombotics     Verify Blood Consent     Verify consent    Verify surgical site documentation    Void on call to OR    Chlorhexidine (CHG) 2% Wipes    Notify Physician/Vital Signs Parameters    Notify physician     Diet NPO    Place sequential compression device       Endocrine    Class 3 severe obesity with body mass index (BMI) of 40.0 to 44.9 in adult    Relevant Medications    LIDOcaine (PF) 10 mg/ml (1%) injection 10 mg    ceFAZolin 2 g in dextrose 5 % in water (D5W) 5 % 50 mL IVPB (MB+)    heparin (porcine) injection 5,000 Units    Other Relevant Orders    Place in Outpatient (Completed)    Vital signs    Insert peripheral IV    Verify beta-blocker dose taken within 24 hours if patient is prescribed beta-blocker    Verify discontinuation of anti thrombotics     Verify Blood Consent     Verify consent    Verify  surgical site documentation    Void on call to OR    Chlorhexidine (CHG) 2% Wipes    Notify Physician/Vital Signs Parameters    Notify physician     Diet NPO    Place sequential compression device     Other Visit Diagnoses       Pre-op testing    -  Primary    Relevant Orders    POCT urine pregnancy (Completed)    History of MI (myocardial infarction)        Relevant Medications    LIDOcaine (PF) 10 mg/ml (1%) injection 10 mg    ceFAZolin 2 g in dextrose 5 % in water (D5W) 5 % 50 mL IVPB (MB+)    heparin (porcine) injection 5,000 Units    Other Relevant Orders    Place in Outpatient (Completed)    Vital signs    Insert peripheral IV    Verify beta-blocker dose taken within 24 hours if patient is prescribed beta-blocker    Verify discontinuation of anti thrombotics     Verify Blood Consent     Verify consent    Verify surgical site documentation    Void on call to OR    Chlorhexidine (CHG) 2% Wipes    Notify Physician/Vital Signs Parameters    Notify physician     Diet NPO    Place sequential compression device    Endometrial carcinoma               HPI Reports long history of AUB. Recent CT for back pain showed thickened endometrial cavity. Subsequent US showed 12.7mm EMS.     Reports 150 lb intentional weight loss from her maximum weight.    Reports 3 heart attacks from [id number] and underwent angiography but no stents. Not currently established with a cardiologist.     Review of Systems   Constitutional:  Negative for chills, fatigue and fever.   Respiratory:  Negative for cough and shortness of breath.    Cardiovascular:  Negative for chest pain.   Gastrointestinal:  Negative for abdominal distention, abdominal pain, constipation and diarrhea.   Genitourinary:  Positive for vaginal bleeding. Negative for dysuria and pelvic pain.   Musculoskeletal:  Negative for back pain.   Psychiatric/Behavioral:  Negative for dysphoric mood. The patient is not nervous/anxious.    Past Medical History:   Diagnosis Date    Asthma      Back pain     Heart attack 2020    x3      Past Surgical History:   Procedure Laterality Date     SECTION      GALLBLADDER SURGERY      TUBAL LIGATION        History reviewed. No pertinent family history.   Social History     Socioeconomic History    Marital status: Single        Objective:      Vitals:    23 1157   BP: 131/64   Pulse: 79   Resp: 20   Temp: 97.9 °F (36.6 °C)      Physical Exam  Constitutional:       General: She is not in acute distress.  HENT:      Head: Normocephalic.   Eyes:      Extraocular Movements: Extraocular movements intact.      Conjunctiva/sclera: Conjunctivae normal.   Cardiovascular:      Rate and Rhythm: Normal rate.      Pulses: Normal pulses.   Pulmonary:      Effort: Pulmonary effort is normal. No respiratory distress.      Breath sounds: No wheezing.   Abdominal:      General: There is no distension.      Tenderness: There is no abdominal tenderness. There is no guarding or rebound.      Comments: Pfannenstiel scar and laparoscopic scars from cholecystectomy   Genitourinary:     Comments: External genitalia normal. Vagina normal. Cervix with no visible lesions. Uterus mobile.  Exam limited by body habitus.     Musculoskeletal:         General: No deformity.   Neurological:      Mental Status: She is alert and oriented to person, place, and time.   Psychiatric:         Mood and Affect: Mood normal.         Behavior: Behavior normal.         Thought Content: Thought content normal.       Lab Results   Component Value Date    WBC 15.20 (H) 2023    HGB 16.1 (H) 2023    HCT 48.5 2023    MCV 89 2023     2023        Assessment:       Pre-op testing  -     POCT urine pregnancy; Standing    Endometrial cancer  -     Case Request Operating Room: XI ROBOTIC HYSTERECTOMY, XI ROBOTIC SALPINGO-OOPHORECTOMY, MAPPING, LYMPH NODE, SENTINEL, BIOPSY, LYMPH NODE  -     Place in Outpatient; Standing  -     Vital signs; Standing  -     Insert  peripheral IV; Standing  -     LIDOcaine (PF) 10 mg/ml (1%) injection 10 mg  -     Verify beta-blocker dose taken within 24 hours if patient is prescribed beta-blocker; Standing  -     Verify discontinuation of anti thrombotics ; Standing  -     Verify Blood Consent ; Standing  -     Verify consent; Standing  -     Verify surgical site documentation; Standing  -     Void on call to OR; Standing  -     Chlorhexidine (CHG) 2% Wipes; Standing  -     Notify Physician/Vital Signs Parameters; Standing  -     Notify physician ; Standing  -     Diet NPO; Standing  -     ceFAZolin 2 g in dextrose 5 % in water (D5W) 5 % 50 mL IVPB (MB+)  -     Cancel: Pulse Oximetry Q4H; Standing  -     Place sequential compression device; Standing  -     Discontinue: heparin (porcine) injection 5,000 Units  -     heparin (porcine) injection 5,000 Units    History of MI (myocardial infarction)  -     Place in Outpatient; Standing  -     Vital signs; Standing  -     Insert peripheral IV; Standing  -     LIDOcaine (PF) 10 mg/ml (1%) injection 10 mg  -     Verify beta-blocker dose taken within 24 hours if patient is prescribed beta-blocker; Standing  -     Verify discontinuation of anti thrombotics ; Standing  -     Verify Blood Consent ; Standing  -     Verify consent; Standing  -     Verify surgical site documentation; Standing  -     Void on call to OR; Standing  -     Chlorhexidine (CHG) 2% Wipes; Standing  -     Notify Physician/Vital Signs Parameters; Standing  -     Notify physician ; Standing  -     Diet NPO; Standing  -     ceFAZolin 2 g in dextrose 5 % in water (D5W) 5 % 50 mL IVPB (MB+)  -     Cancel: Pulse Oximetry Q4H; Standing  -     Place sequential compression device; Standing  -     Discontinue: heparin (porcine) injection 5,000 Units  -     heparin (porcine) injection 5,000 Units    Class 3 severe obesity with body mass index (BMI) of 40.0 to 44.9 in adult, unspecified obesity type, unspecified whether serious comorbidity  present  -     Place in Outpatient; Standing  -     Vital signs; Standing  -     Insert peripheral IV; Standing  -     LIDOcaine (PF) 10 mg/ml (1%) injection 10 mg  -     Verify beta-blocker dose taken within 24 hours if patient is prescribed beta-blocker; Standing  -     Verify discontinuation of anti thrombotics ; Standing  -     Verify Blood Consent ; Standing  -     Verify consent; Standing  -     Verify surgical site documentation; Standing  -     Void on call to OR; Standing  -     Chlorhexidine (CHG) 2% Wipes; Standing  -     Notify Physician/Vital Signs Parameters; Standing  -     Notify physician ; Standing  -     Diet NPO; Standing  -     ceFAZolin 2 g in dextrose 5 % in water (D5W) 5 % 50 mL IVPB (MB+)  -     Cancel: Pulse Oximetry Q4H; Standing  -     Place sequential compression device; Standing  -     Discontinue: heparin (porcine) injection 5,000 Units  -     heparin (porcine) injection 5,000 Units    Endometrial carcinoma    Other orders  -     Ambulate Up Ad Sadaf; Standing  -     IP VTE HIGH RISK PATIENT; Standing  -     albuterol sulfate nebulizer solution 2.5 mg  -     Inhalation Treatment Once; Standing  -     Vital signs; Standing  -     Insert peripheral IV; Standing  -     Use 1% lidocaine at IV site; Standing  -     LIDOcaine (PF) 10 mg/ml (1%) injection 10 mg  -     Pulse Oximetry Q4H; Standing         Plan:       Endometrial cancer: Prior work up showed EMB with G1 endometrioid carcinoma and CAH as well as a 12.7 mm EMS . I had an extensive conversation with the patient today giving a broad overview of endometrial cancer to include epidemiology, risk factors, clinical features, diagnosis, as well as surgical treatment.  I have recommended robotic-assisted hysterectomy, bilateral salpingo-oophorectomy and sentinel lymph node mapping and biopsy. Discussed role of systematic lymphadenectomy if no mapping.  Based on the data from surgery we will determine whether adjuvant therapy would be  recommended. I discussed extensively the risks, benefits, alternatives, and indications of the planned procedure to include the risk of damage to bowel, bladder, ureter, or any other abdominal or pelvic organ as well as the risks of conversion to a laparotomy. Additionally, she understands the surgical risks of infection, allergic reaction, bleeding possibly severe enough to require blood transfusion, blood clots, and cardiopulmonary complications.  She expresses understanding, was given an opportunity to ask questions. After all questions had been answered she strongly desires to proceed with planned procedure.  Plan for Robotic hysterectomy, BSO, and SLN biopsy  4/26    2. Morbid obesity: BMI 44. Reports past success with 150lb weight loss from max weight of 400 lbs. Reviewed the potentially causal relationship between obesity and CAH/endometrial cancer. Encouraged continued weight loss efforts.    3. History of myocardial infarction: urgent refer to cardiology to establish ongoing care and preoperative risk assessment and optimization with an effort to expedite evaluation and work up prior to planned surgery on 4/26.    As part of the medical decision making process I reviewed the referring provides notes, relevant labs, imaging reports. I spent 60 minutes of face to face counseling, care coordination, and records review related to today's encounter.    .      Prasad Hathaway MD

## 2023-04-26 NOTE — PLAN OF CARE
Silvana Coronado has met all discharge criteria from Phase II. Vital Signs are stable, ambulating  without difficulty.Pain is now under control and tolerable for the pt. Pain score 3 at this time.  Discharge instructions given, patient verbalized understanding. Discharged from facility via wheelchair in stable condition.

## 2023-04-26 NOTE — ANESTHESIA POSTPROCEDURE EVALUATION
Anesthesia Post Evaluation    Patient: Silvana Coronado    Procedure(s) Performed: Procedure(s) (LRB):  XI ROBOTIC HYSTERECTOMY (N/A)  XI ROBOTIC SALPINGO-OOPHORECTOMY (Bilateral)  MAPPING, LYMPH NODE, SENTINEL (N/A)  BIOPSY, LYMPH NODE (N/A)  XI ROBOTIC LYSIS, ADHESIONS (N/A)    Final Anesthesia Type: general      Patient location during evaluation: PACU  Patient participation: Yes- Able to Participate  Level of consciousness: awake and alert  Post-procedure vital signs: reviewed and stable  Pain management: adequate  Airway patency: patent    PONV status at discharge: No PONV  Anesthetic complications: no      Cardiovascular status: blood pressure returned to baseline  Respiratory status: spontaneous ventilation  Hydration status: euvolemic  Follow-up not needed.          Vitals Value Taken Time   /57 04/26/23 1601   Temp 36.4 °C (97.5 °F) 04/26/23 1458   Pulse 86 04/26/23 1608   Resp 16 04/26/23 1530   SpO2 95 % 04/26/23 1608   Vitals shown include unvalidated device data.      No case tracking events are documented in the log.      Pain/Vazquez Score: Pain Rating Prior to Med Admin: 10 (4/26/2023  3:05 PM)  Pain Rating Post Med Admin: 5 (4/26/2023  3:30 PM)  Vazquez Score: 9 (4/26/2023  3:30 PM)

## 2023-04-26 NOTE — TRANSFER OF CARE
Anesthesia Transfer of Care Note    Patient: Silvana Coronado    Procedure(s) Performed: Procedure(s) (LRB):  XI ROBOTIC HYSTERECTOMY (N/A)  XI ROBOTIC SALPINGO-OOPHORECTOMY (Bilateral)  MAPPING, LYMPH NODE, SENTINEL (N/A)  BIOPSY, LYMPH NODE (N/A)  XI ROBOTIC LYSIS, ADHESIONS (N/A)    Patient location: PACU    Anesthesia Type: general    Transport from OR: Transported from OR on 6-10 L/min O2 by face mask with adequate spontaneous ventilation    Post pain: adequate analgesia    Post assessment: no apparent anesthetic complications    Post vital signs: stable    Level of consciousness: awake    Nausea/Vomiting: no nausea/vomiting    Complications: none    Transfer of care protocol was followed      Last vitals:   Visit Vitals  /64 (BP Location: Right arm, Patient Position: Lying)   Pulse 79   Temp 36.6 °C (97.9 °F) (Oral)   Resp 20   LMP 04/26/2023   SpO2 (!) 94%   Breastfeeding No

## 2023-04-27 VITALS
HEART RATE: 88 BPM | DIASTOLIC BLOOD PRESSURE: 70 MMHG | TEMPERATURE: 98 F | SYSTOLIC BLOOD PRESSURE: 140 MMHG | RESPIRATION RATE: 16 BRPM | OXYGEN SATURATION: 98 %

## 2023-05-05 ENCOUNTER — TELEPHONE (OUTPATIENT)
Dept: GYNECOLOGIC ONCOLOGY | Facility: CLINIC | Age: 43
End: 2023-05-05
Payer: MEDICAID

## 2023-05-05 ENCOUNTER — HOSPITAL ENCOUNTER (INPATIENT)
Facility: OTHER | Age: 43
LOS: 3 days | Discharge: HOME | DRG: 862 | End: 2023-05-08
Attending: OBSTETRICS & GYNECOLOGY | Admitting: OBSTETRICS & GYNECOLOGY
Payer: MEDICAID

## 2023-05-05 DIAGNOSIS — Z90.710 HISTORY OF ROBOT-ASSISTED LAPAROSCOPIC HYSTERECTOMY: Primary | ICD-10-CM

## 2023-05-05 DIAGNOSIS — T81.40XA POST OP INFECTION: ICD-10-CM

## 2023-05-05 DIAGNOSIS — R00.1 BRADYCARDIA: ICD-10-CM

## 2023-05-05 PROBLEM — Z72.0 TOBACCO ABUSE: Status: ACTIVE | Noted: 2023-04-25

## 2023-05-05 PROBLEM — T81.43XA POSTOPERATIVE INTRA-ABDOMINAL ABSCESS: Status: ACTIVE | Noted: 2023-05-05

## 2023-05-05 PROBLEM — K65.1 POSTOPERATIVE INTRA-ABDOMINAL ABSCESS: Status: ACTIVE | Noted: 2023-05-05

## 2023-05-05 PROBLEM — I10 BENIGN ESSENTIAL HYPERTENSION: Status: ACTIVE | Noted: 2023-05-05

## 2023-05-05 LAB
ALBUMIN SERPL BCP-MCNC: 2.9 G/DL (ref 3.5–5.2)
ALP SERPL-CCNC: 99 U/L (ref 55–135)
ALT SERPL W/O P-5'-P-CCNC: 35 U/L (ref 10–44)
ANION GAP SERPL CALC-SCNC: 9 MMOL/L (ref 8–16)
AST SERPL-CCNC: 21 U/L (ref 10–40)
BASOPHILS # BLD AUTO: 0.05 K/UL (ref 0–0.2)
BASOPHILS NFR BLD: 0.3 % (ref 0–1.9)
BILIRUB SERPL-MCNC: 0.2 MG/DL (ref 0.1–1)
BUN SERPL-MCNC: 7 MG/DL (ref 6–20)
CALCIUM SERPL-MCNC: 8.7 MG/DL (ref 8.7–10.5)
CHLORIDE SERPL-SCNC: 110 MMOL/L (ref 95–110)
CO2 SERPL-SCNC: 21 MMOL/L (ref 23–29)
CREAT SERPL-MCNC: 0.7 MG/DL (ref 0.5–1.4)
DIFFERENTIAL METHOD: ABNORMAL
EOSINOPHIL # BLD AUTO: 1.2 K/UL (ref 0–0.5)
EOSINOPHIL NFR BLD: 8.4 % (ref 0–8)
ERYTHROCYTE [DISTWIDTH] IN BLOOD BY AUTOMATED COUNT: 12.9 % (ref 11.5–14.5)
EST. GFR  (NO RACE VARIABLE): >60 ML/MIN/1.73 M^2
GLUCOSE SERPL-MCNC: 138 MG/DL (ref 70–110)
HCT VFR BLD AUTO: 36.5 % (ref 37–48.5)
HGB BLD-MCNC: 11.9 G/DL (ref 12–16)
IMM GRANULOCYTES # BLD AUTO: 0.12 K/UL (ref 0–0.04)
IMM GRANULOCYTES NFR BLD AUTO: 0.8 % (ref 0–0.5)
INR PPP: 1 (ref 0.8–1.2)
LYMPHOCYTES # BLD AUTO: 3.1 K/UL (ref 1–4.8)
LYMPHOCYTES NFR BLD: 21.2 % (ref 18–48)
MCH RBC QN AUTO: 29.6 PG (ref 27–31)
MCHC RBC AUTO-ENTMCNC: 32.6 G/DL (ref 32–36)
MCV RBC AUTO: 91 FL (ref 82–98)
MONOCYTES # BLD AUTO: 0.9 K/UL (ref 0.3–1)
MONOCYTES NFR BLD: 5.9 % (ref 4–15)
NEUTROPHILS # BLD AUTO: 9.3 K/UL (ref 1.8–7.7)
NEUTROPHILS NFR BLD: 63.4 % (ref 38–73)
NRBC BLD-RTO: 0 /100 WBC
PLATELET # BLD AUTO: 499 K/UL (ref 150–450)
PMV BLD AUTO: 8.7 FL (ref 9.2–12.9)
POTASSIUM SERPL-SCNC: 3.5 MMOL/L (ref 3.5–5.1)
PROT SERPL-MCNC: 6.9 G/DL (ref 6–8.4)
PROTHROMBIN TIME: 10.6 SEC (ref 9–12.5)
RBC # BLD AUTO: 4.02 M/UL (ref 4–5.4)
SODIUM SERPL-SCNC: 140 MMOL/L (ref 136–145)
WBC # BLD AUTO: 14.6 K/UL (ref 3.9–12.7)

## 2023-05-05 PROCEDURE — 80053 COMPREHEN METABOLIC PANEL: CPT

## 2023-05-05 PROCEDURE — 87070 CULTURE OTHR SPECIMN AEROBIC: CPT

## 2023-05-05 PROCEDURE — 87075 CULTR BACTERIA EXCEPT BLOOD: CPT

## 2023-05-05 PROCEDURE — 99024 POSTOP FOLLOW-UP VISIT: CPT | Mod: ,,, | Performed by: OBSTETRICS & GYNECOLOGY

## 2023-05-05 PROCEDURE — 36415 COLL VENOUS BLD VENIPUNCTURE: CPT

## 2023-05-05 PROCEDURE — 99024 PR POST-OP FOLLOW-UP VISIT: ICD-10-PCS | Mod: ,,, | Performed by: OBSTETRICS & GYNECOLOGY

## 2023-05-05 PROCEDURE — 25000003 PHARM REV CODE 250

## 2023-05-05 PROCEDURE — 85610 PROTHROMBIN TIME: CPT

## 2023-05-05 PROCEDURE — 11000001 HC ACUTE MED/SURG PRIVATE ROOM

## 2023-05-05 PROCEDURE — 85025 COMPLETE CBC W/AUTO DIFF WBC: CPT

## 2023-05-05 PROCEDURE — 87040 BLOOD CULTURE FOR BACTERIA: CPT | Mod: 59

## 2023-05-05 PROCEDURE — 87076 CULTURE ANAEROBE IDENT EACH: CPT

## 2023-05-05 PROCEDURE — 25000003 PHARM REV CODE 250: Performed by: OBSTETRICS & GYNECOLOGY

## 2023-05-05 PROCEDURE — 63600175 PHARM REV CODE 636 W HCPCS

## 2023-05-05 PROCEDURE — 83036 HEMOGLOBIN GLYCOSYLATED A1C: CPT

## 2023-05-05 RX ORDER — HYDRALAZINE HYDROCHLORIDE 20 MG/ML
10 INJECTION INTRAMUSCULAR; INTRAVENOUS EVERY 6 HOURS PRN
Status: DISCONTINUED | OUTPATIENT
Start: 2023-05-05 | End: 2023-05-08 | Stop reason: HOSPADM

## 2023-05-05 RX ORDER — HYDROMORPHONE HYDROCHLORIDE 1 MG/ML
0.5 INJECTION, SOLUTION INTRAMUSCULAR; INTRAVENOUS; SUBCUTANEOUS EVERY 6 HOURS PRN
Status: DISCONTINUED | OUTPATIENT
Start: 2023-05-05 | End: 2023-05-08 | Stop reason: HOSPADM

## 2023-05-05 RX ORDER — IBUPROFEN 600 MG/1
600 TABLET ORAL EVERY 6 HOURS
Status: DISCONTINUED | OUTPATIENT
Start: 2023-05-06 | End: 2023-05-06

## 2023-05-05 RX ORDER — MUPIROCIN 20 MG/G
OINTMENT TOPICAL 2 TIMES DAILY
Status: DISCONTINUED | OUTPATIENT
Start: 2023-05-05 | End: 2023-05-08 | Stop reason: HOSPADM

## 2023-05-05 RX ORDER — OXYCODONE HYDROCHLORIDE 5 MG/1
10 TABLET ORAL EVERY 6 HOURS PRN
Status: DISCONTINUED | OUTPATIENT
Start: 2023-05-05 | End: 2023-05-08 | Stop reason: HOSPADM

## 2023-05-05 RX ORDER — IBUPROFEN 200 MG
1 TABLET ORAL DAILY
Status: DISCONTINUED | OUTPATIENT
Start: 2023-05-06 | End: 2023-05-08 | Stop reason: HOSPADM

## 2023-05-05 RX ORDER — SODIUM CHLORIDE 0.9 % (FLUSH) 0.9 %
10 SYRINGE (ML) INJECTION
Status: DISCONTINUED | OUTPATIENT
Start: 2023-05-05 | End: 2023-05-08 | Stop reason: HOSPADM

## 2023-05-05 RX ORDER — ONDANSETRON 8 MG/1
8 TABLET, ORALLY DISINTEGRATING ORAL EVERY 8 HOURS PRN
Status: DISCONTINUED | OUTPATIENT
Start: 2023-05-05 | End: 2023-05-08 | Stop reason: HOSPADM

## 2023-05-05 RX ORDER — OXYCODONE HYDROCHLORIDE 5 MG/1
5 TABLET ORAL EVERY 4 HOURS PRN
Status: DISCONTINUED | OUTPATIENT
Start: 2023-05-05 | End: 2023-05-08 | Stop reason: HOSPADM

## 2023-05-05 RX ORDER — ENOXAPARIN SODIUM 100 MG/ML
40 INJECTION SUBCUTANEOUS EVERY 12 HOURS
Status: DISCONTINUED | OUTPATIENT
Start: 2023-05-05 | End: 2023-05-07

## 2023-05-05 RX ORDER — ACETAMINOPHEN 325 MG/1
650 TABLET ORAL
Status: DISCONTINUED | OUTPATIENT
Start: 2023-05-05 | End: 2023-05-08 | Stop reason: HOSPADM

## 2023-05-05 RX ORDER — PROCHLORPERAZINE EDISYLATE 5 MG/ML
5 INJECTION INTRAMUSCULAR; INTRAVENOUS EVERY 6 HOURS PRN
Status: DISCONTINUED | OUTPATIENT
Start: 2023-05-05 | End: 2023-05-08 | Stop reason: HOSPADM

## 2023-05-05 RX ORDER — IBUPROFEN 400 MG/1
400 TABLET ORAL EVERY 6 HOURS
Status: DISCONTINUED | OUTPATIENT
Start: 2023-05-06 | End: 2023-05-05

## 2023-05-05 RX ADMIN — PIPERACILLIN AND TAZOBACTAM 4.5 G: 4; .5 INJECTION, POWDER, LYOPHILIZED, FOR SOLUTION INTRAVENOUS; PARENTERAL at 09:05

## 2023-05-05 RX ADMIN — ACETAMINOPHEN 650 MG: 325 TABLET, FILM COATED ORAL at 10:05

## 2023-05-05 RX ADMIN — MUPIROCIN: 20 OINTMENT TOPICAL at 10:05

## 2023-05-05 NOTE — NURSING TRANSFER
Nursing Transfer Note      5/5/2023     Reason patient is being transferred: post op infection    Transfer From: [facility]    Transfer via stretcher    Transfer with personal belongings    Transported by EMS    Medicines sent: No    Any special needs or follow-up needed: Admission orders    Chart send with patient: Yes    Notified: daughter, son (per pt)    Upon arrival to floor: patient oriented to room, call bell in reach, and bed in lowest position

## 2023-05-05 NOTE — TELEPHONE ENCOUNTER
Spoke with pt,  she is c/o yellow vaginal discharge with odor.  She is s/p hyst 4/26.  She states it started 2 days ago.  She has been having diarrhea, vomiting and fever that started 4 days ago.   She said the fever broke yesterday,  she thought she had a stomach virus.  She denies any issues with her incisions.  I told pt the discharge could be from the constant diarrhea.   But at this point she may be dehydrated.   I told pt I will forward to PA or MD for advice.

## 2023-05-05 NOTE — TELEPHONE ENCOUNTER
----- Message from Savanah Barrett sent at 5/5/2023  9:24 AM CDT -----  Type:  Needs Medical Advice    Who Called: pt  Symptoms (please be specific): pt had a procedure done 04/26/23 and the pt is having some discharge with a smell  and yellow in color and the pt is requesting to discuss   How long has patient had these symptoms:  2 days      Would the patient rather a call back or a response via [facility]? call  Best Call Back Number: [phone]  Additional Information:

## 2023-05-05 NOTE — TELEPHONE ENCOUNTER
Called pt back and advised her to go to local ED for eval of symptoms.   She verbalized understanding.

## 2023-05-05 NOTE — LETTER
May 8, 2023         [address]  [address]  [facility] [address]  Phone: [phone]  Fax: [phone]       Patient: Silvana Coronado   YOB: 1980  Date of Visit: 05/08/2023    To Whom It May Concern:    Gerard Rachel was at [facility] on 05/08/2023 and may return to work/school on 5/9/2023 with no restrictions. If you have any questions or concerns, or if I can be of further assistance, please do not hesitate to contact me.    Sincerely,    Nancy Lockhart MD

## 2023-05-05 NOTE — LETTER
May 8, 2023         [address]  [address]  [facility] [address]  Phone: [phone]  Fax: [phone]       Patient: Silvana Coronado   YOB: 1980  Date of Visit: 05/08/2023    To Whom It May Concern:    Gerard Rachel  was at [facility] from 5/5/2023 to 5/8/2023. He may return to work/school on 5/10/23 with no restrictions. If you have any questions or concerns, or if I can be of further assistance, please do not hesitate to contact me.    Sincerely,    Jennifer Burt MD

## 2023-05-06 LAB
ALBUMIN SERPL BCP-MCNC: 2.6 G/DL (ref 3.5–5.2)
ALP SERPL-CCNC: 96 U/L (ref 55–135)
ALT SERPL W/O P-5'-P-CCNC: 31 U/L (ref 10–44)
AMORPH CRY URNS QL MICRO: ABNORMAL
ANION GAP SERPL CALC-SCNC: 8 MMOL/L (ref 8–16)
AST SERPL-CCNC: 19 U/L (ref 10–40)
BACTERIA #/AREA URNS HPF: ABNORMAL /HPF
BASOPHILS # BLD AUTO: 0.05 K/UL (ref 0–0.2)
BASOPHILS NFR BLD: 0.4 % (ref 0–1.9)
BILIRUB SERPL-MCNC: 0.2 MG/DL (ref 0.1–1)
BILIRUB UR QL STRIP: NEGATIVE
BUN SERPL-MCNC: 8 MG/DL (ref 6–20)
CALCIUM SERPL-MCNC: 8.7 MG/DL (ref 8.7–10.5)
CHLORIDE SERPL-SCNC: 109 MMOL/L (ref 95–110)
CLARITY UR: ABNORMAL
CO2 SERPL-SCNC: 22 MMOL/L (ref 23–29)
COLOR UR: YELLOW
CREAT SERPL-MCNC: 0.8 MG/DL (ref 0.5–1.4)
DIFFERENTIAL METHOD: ABNORMAL
EOSINOPHIL # BLD AUTO: 1.3 K/UL (ref 0–0.5)
EOSINOPHIL NFR BLD: 10.4 % (ref 0–8)
ERYTHROCYTE [DISTWIDTH] IN BLOOD BY AUTOMATED COUNT: 12.9 % (ref 11.5–14.5)
EST. GFR  (NO RACE VARIABLE): >60 ML/MIN/1.73 M^2
ESTIMATED AVG GLUCOSE: 137 MG/DL (ref 68–131)
GLUCOSE SERPL-MCNC: 202 MG/DL (ref 70–110)
GLUCOSE UR QL STRIP: ABNORMAL
HBA1C MFR BLD: 6.4 % (ref 4–5.6)
HCT VFR BLD AUTO: 36.1 % (ref 37–48.5)
HGB BLD-MCNC: 11.6 G/DL (ref 12–16)
HGB UR QL STRIP: ABNORMAL
HYALINE CASTS #/AREA URNS LPF: 0 /LPF
IMM GRANULOCYTES # BLD AUTO: 0.08 K/UL (ref 0–0.04)
IMM GRANULOCYTES NFR BLD AUTO: 0.6 % (ref 0–0.5)
KETONES UR QL STRIP: NEGATIVE
LEUKOCYTE ESTERASE UR QL STRIP: ABNORMAL
LYMPHOCYTES # BLD AUTO: 3.7 K/UL (ref 1–4.8)
LYMPHOCYTES NFR BLD: 29.4 % (ref 18–48)
MCH RBC QN AUTO: 29.4 PG (ref 27–31)
MCHC RBC AUTO-ENTMCNC: 32.1 G/DL (ref 32–36)
MCV RBC AUTO: 91 FL (ref 82–98)
MICROSCOPIC COMMENT: ABNORMAL
MONOCYTES # BLD AUTO: 0.8 K/UL (ref 0.3–1)
MONOCYTES NFR BLD: 6.7 % (ref 4–15)
NEUTROPHILS # BLD AUTO: 6.6 K/UL (ref 1.8–7.7)
NEUTROPHILS NFR BLD: 52.5 % (ref 38–73)
NITRITE UR QL STRIP: NEGATIVE
NRBC BLD-RTO: 0 /100 WBC
PH UR STRIP: 6 [PH] (ref 5–8)
PLATELET # BLD AUTO: 462 K/UL (ref 150–450)
PMV BLD AUTO: 8.7 FL (ref 9.2–12.9)
POTASSIUM SERPL-SCNC: 3.8 MMOL/L (ref 3.5–5.1)
PROT SERPL-MCNC: 6.4 G/DL (ref 6–8.4)
PROT UR QL STRIP: ABNORMAL
RBC # BLD AUTO: 3.95 M/UL (ref 4–5.4)
RBC #/AREA URNS HPF: 18 /HPF (ref 0–4)
SODIUM SERPL-SCNC: 139 MMOL/L (ref 136–145)
SP GR UR STRIP: >1.03 (ref 1–1.03)
SQUAMOUS #/AREA URNS HPF: 7 /HPF
URN SPEC COLLECT METH UR: ABNORMAL
UROBILINOGEN UR STRIP-ACNC: NEGATIVE EU/DL
WBC # BLD AUTO: 12.6 K/UL (ref 3.9–12.7)
WBC #/AREA URNS HPF: >100 /HPF (ref 0–5)
WBC CLUMPS URNS QL MICRO: ABNORMAL

## 2023-05-06 PROCEDURE — 80053 COMPREHEN METABOLIC PANEL: CPT

## 2023-05-06 PROCEDURE — 81000 URINALYSIS NONAUTO W/SCOPE: CPT

## 2023-05-06 PROCEDURE — 99024 PR POST-OP FOLLOW-UP VISIT: ICD-10-PCS | Mod: ,,, | Performed by: OBSTETRICS & GYNECOLOGY

## 2023-05-06 PROCEDURE — 36415 COLL VENOUS BLD VENIPUNCTURE: CPT

## 2023-05-06 PROCEDURE — 85025 COMPLETE CBC W/AUTO DIFF WBC: CPT

## 2023-05-06 PROCEDURE — S0030 INJECTION, METRONIDAZOLE: HCPCS

## 2023-05-06 PROCEDURE — 25000003 PHARM REV CODE 250

## 2023-05-06 PROCEDURE — 63600175 PHARM REV CODE 636 W HCPCS: Performed by: OBSTETRICS & GYNECOLOGY

## 2023-05-06 PROCEDURE — 87086 URINE CULTURE/COLONY COUNT: CPT

## 2023-05-06 PROCEDURE — 25000003 PHARM REV CODE 250: Performed by: OBSTETRICS & GYNECOLOGY

## 2023-05-06 PROCEDURE — 63600175 PHARM REV CODE 636 W HCPCS

## 2023-05-06 PROCEDURE — 11000001 HC ACUTE MED/SURG PRIVATE ROOM

## 2023-05-06 PROCEDURE — 99024 POSTOP FOLLOW-UP VISIT: CPT | Mod: ,,, | Performed by: OBSTETRICS & GYNECOLOGY

## 2023-05-06 RX ORDER — METRONIDAZOLE 500 MG/100ML
500 INJECTION, SOLUTION INTRAVENOUS
Status: DISCONTINUED | OUTPATIENT
Start: 2023-05-06 | End: 2023-05-08 | Stop reason: HOSPADM

## 2023-05-06 RX ORDER — IBUPROFEN 600 MG/1
600 TABLET ORAL EVERY 6 HOURS
Status: DISCONTINUED | OUTPATIENT
Start: 2023-05-07 | End: 2023-05-08 | Stop reason: HOSPADM

## 2023-05-06 RX ORDER — KETOROLAC TROMETHAMINE 30 MG/ML
30 INJECTION, SOLUTION INTRAMUSCULAR; INTRAVENOUS EVERY 6 HOURS
Status: COMPLETED | OUTPATIENT
Start: 2023-05-06 | End: 2023-05-07

## 2023-05-06 RX ADMIN — PIPERACILLIN AND TAZOBACTAM 4.5 G: 4; .5 INJECTION, POWDER, LYOPHILIZED, FOR SOLUTION INTRAVENOUS; PARENTERAL at 06:05

## 2023-05-06 RX ADMIN — PIPERACILLIN AND TAZOBACTAM 4.5 G: 4; .5 INJECTION, POWDER, LYOPHILIZED, FOR SOLUTION INTRAVENOUS; PARENTERAL at 10:05

## 2023-05-06 RX ADMIN — VANCOMYCIN HYDROCHLORIDE 1750 MG: 500 INJECTION, POWDER, LYOPHILIZED, FOR SOLUTION INTRAVENOUS at 09:05

## 2023-05-06 RX ADMIN — OXYCODONE HYDROCHLORIDE 10 MG: 5 TABLET ORAL at 08:05

## 2023-05-06 RX ADMIN — ACETAMINOPHEN 650 MG: 325 TABLET, FILM COATED ORAL at 04:05

## 2023-05-06 RX ADMIN — KETOROLAC TROMETHAMINE 30 MG: 30 INJECTION, SOLUTION INTRAMUSCULAR; INTRAVENOUS at 05:05

## 2023-05-06 RX ADMIN — MUPIROCIN: 20 OINTMENT TOPICAL at 08:05

## 2023-05-06 RX ADMIN — IBUPROFEN 600 MG: 600 TABLET, FILM COATED ORAL at 05:05

## 2023-05-06 RX ADMIN — IBUPROFEN 600 MG: 600 TABLET, FILM COATED ORAL at 12:05

## 2023-05-06 RX ADMIN — ACETAMINOPHEN 650 MG: 325 TABLET, FILM COATED ORAL at 03:05

## 2023-05-06 RX ADMIN — VANCOMYCIN HYDROCHLORIDE 2000 MG: 500 INJECTION, POWDER, LYOPHILIZED, FOR SOLUTION INTRAVENOUS at 01:05

## 2023-05-06 RX ADMIN — KETOROLAC TROMETHAMINE 30 MG: 30 INJECTION, SOLUTION INTRAMUSCULAR; INTRAVENOUS at 12:05

## 2023-05-06 RX ADMIN — METRONIDAZOLE 500 MG: 5 INJECTION, SOLUTION INTRAVENOUS at 01:05

## 2023-05-06 RX ADMIN — PIPERACILLIN AND TAZOBACTAM 4.5 G: 4; .5 INJECTION, POWDER, LYOPHILIZED, FOR SOLUTION INTRAVENOUS; PARENTERAL at 02:05

## 2023-05-06 RX ADMIN — HYDROMORPHONE HYDROCHLORIDE 0.5 MG: 0.5 INJECTION, SOLUTION INTRAMUSCULAR; INTRAVENOUS; SUBCUTANEOUS at 08:05

## 2023-05-06 RX ADMIN — OXYCODONE HYDROCHLORIDE 10 MG: 5 TABLET ORAL at 12:05

## 2023-05-06 RX ADMIN — METRONIDAZOLE 500 MG: 5 INJECTION, SOLUTION INTRAVENOUS at 08:05

## 2023-05-06 RX ADMIN — OXYCODONE HYDROCHLORIDE 10 MG: 5 TABLET ORAL at 01:05

## 2023-05-06 RX ADMIN — ACETAMINOPHEN 650 MG: 325 TABLET, FILM COATED ORAL at 08:05

## 2023-05-06 NOTE — H&P
[facility] ([address])  Gynecologic Oncology  H&P    Patient Name: Silvana Coronado  MRN: [id number]  Admission Date: 5/5/2023  Primary Care Provider: Jacky Serrato PA-C   Principal Problem: Postoperative intra-abdominal abscess    Subjective:     Chief Complaint/Reason for Admission: Post operative intra abdominal abscess     History of Present Illness:  Silvana Coronado is a 42 y.o. grade 1 stage 1a endometrioid cancer POD#9  RA-TLH/BSO/SNLD/JASVIR with Dr. Hathaway. PMHx h/o STEMI x3, obesity, HTN, tobacco usage, DM2.     Patient underwent surgical staging with Dr Hathaway on 4/26/23. Surgery complicated by extensive abdominal adhesions and presence of abscess traversing length of colpotomy along posterior edge. Surgifo was applied to colpotomy with adequate hemostasis. Pathology confirmed grade 1 stage IA endometrioid cancer.     Patient initially presented to OSH for subjective fevers, nausea, vomiting, diarrhea, pelvic cramps x5 days and vaginal discharge x2 days. At OSH afebrile VSS. WBC 17, lactate 2.5. Given 1L LR and IV Vanc. CT AP with air fluid collection with mild rim enhancement in cul de sac 3.6x4cm. Transferred to [facility] for higher level of care.     On admission patient reports she has had subjective fevers, nausea vomiting and diarrhea for the last 5 days. Initially she suspected a food borne illness. Then 2 days ago she notice a purulent foul smelling vaginal discharge requiring her to wear a pad. Denies any vaginal bleeding. Maintains pelvic rest. Denies any dysuria however reports pelvic pressure with urinating. Reports her post operative pain had been well controlled on scheduled ibuprofen/Tylenol however her pelvic cramps started with the diarrhea. Denies any hematochezia/melena. Denies any CP, SOB.       Hospital Course:  05/05/2023 HD#1/POD#9: Admitted for post op intra abdominal abscess seen on CT AP at OSH (3.6x4cm). Blood, urine and vaginal cx obtained. IV  Ariana/Zosyn initaited. WBC 15.           Past Medical History:   Diagnosis Date    Asthma     Back pain     Heart attack 2020    x3     Past Surgical History:   Procedure Laterality Date     SECTION      GALLBLADDER SURGERY      LYMPH NODE BIOPSY N/A 2023    Procedure: BIOPSY, LYMPH NODE;  Surgeon: Prasad Hathaway MD;  Location: [facility];  Service: OB/GYN;  Laterality: N/A;    MAPPING, LYMPH NODE, SENTINEL N/A 2023    Procedure: MAPPING, LYMPH NODE, SENTINEL;  Surgeon: Prasad Hathaway MD;  Location: [facility];  Service: OB/GYN;  Laterality: N/A;    ROBOT-ASSISTED LAPAROSCOPIC ABDOMINAL HYSTERECTOMY USING DA ALLEGRA XI N/A 2023    Procedure: XI ROBOTIC HYSTERECTOMY;  Surgeon: Prasad Hathaway MD;  Location: [facility];  Service: OB/GYN;  Laterality: N/A;  3.5 HOURS    ROBOT-ASSISTED LAPAROSCOPIC LYSIS OF ADHESIONS USING DA ALLEGRA XI N/A 2023    Procedure: XI ROBOTIC LYSIS, ADHESIONS;  Surgeon: Prasad Hathaway MD;  Location: [facility];  Service: OB/GYN;  Laterality: N/A;    ROBOT-ASSISTED LAPAROSCOPIC SALPINGO-OOPHORECTOMY USING DA ALLEGRA XI Bilateral 2023    Procedure: XI ROBOTIC SALPINGO-OOPHORECTOMY;  Surgeon: Prasad Hathaway MD;  Location: [facility];  Service: OB/GYN;  Laterality: Bilateral;    TUBAL LIGATION       Family History    None       Tobacco Use    Smoking status: Every Day     Packs/day: 1.50     Years: 15.00     Pack years: 22.50     Types: Cigarettes    Smokeless tobacco: Not on file   Substance and Sexual Activity    Alcohol use: Never    Drug use: Never    Sexual activity: Yes     Partners: Male     Birth control/protection: None       PTA Medications   Medication Sig    acetaminophen (TYLENOL) 500 MG tablet Take 1 tablet (500 mg total) by mouth every 6 (six) hours as needed for Pain. Take alternating with ibuprofen hours scheduled for the 1st week after surgery    docusate sodium (COLACE) 100 MG capsule Take 1 capsule (100 mg total) by  mouth 2 (two) times daily as needed for Constipation.    ibuprofen (ADVIL,MOTRIN) 600 MG tablet Take 1 tablet (600 mg total) by mouth every 6 (six) hours as needed for Pain. Take scheduled alternating with tylenol every 6 hours for the first week after surgery    oxyCODONE-acetaminophen (PERCOCET) 5-325 mg per tablet Take 1 tablet by mouth every 4 (four) hours as needed for Pain.       Review of patient's allergies indicates:  No Known Allergies    Review of Systems   Constitutional:  Negative for activity change, appetite change, chills and fever.   Respiratory:  Negative for cough and shortness of breath.    Cardiovascular:  Negative for chest pain.   Gastrointestinal:  Positive for abdominal pain, diarrhea, nausea and vomiting. Negative for constipation.   Genitourinary:  Positive for vaginal discharge. Negative for dysuria, frequency and vaginal bleeding.   Musculoskeletal:  Negative for back pain.   Neurological:  Negative for numbness and headaches.   Psychiatric/Behavioral:  Negative for depression. The patient is not nervous/anxious.     Objective:     Vital Signs (Most Recent):  Temp: 98.1 °F (36.7 °C) (05/05/23 1833)  Pulse: 82 (05/05/23 1833)  Resp: 18 (05/05/23 1833)  BP: (!) 119/57 (05/05/23 1833)  SpO2: 97 % (05/05/23 1833)   Vital Signs (24h Range):  Temp:  [98.1 °F (36.7 °C)-98.6 °F (37 °C)] 98.1 °F (36.7 °C)  Pulse:  [72-82] 82  Resp:  [18] 18  SpO2:  [97 %-99 %] 97 %  BP: (119-136)/(57-85) 119/57     Weight: 109.8 kg (242 lb 1 oz)  Body mass index is 44.27 kg/m².       Physical Exam:   Constitutional: She is oriented to person, place, and time. She appears well-developed and well-nourished. No distress.    HENT:   Head: Normocephalic and atraumatic.    Eyes: EOM are normal.     Cardiovascular:  Normal rate and intact distal pulses.      Exam reveals no edema.        Pulmonary/Chest: Effort normal and breath sounds normal. No respiratory distress.        Abdominal: Soft. She exhibits abdominal  incision. She exhibits no distension. There is no abdominal tenderness.   Robotic port sites x5 c/d/I      Genitourinary:    Genitourinary Comments: Limited  exam performed with sterile speculum. Poorly tolerated due to patient discomfort. Thin purulent discharge noted. No bowel evisceration or herniation. Cuff not well visualized due to redundant vaginal tissue. SVE deferred due to patient discomfort. No vaginal bleeding.               Musculoskeletal: Normal range of motion and moves all extremeties. No tenderness or edema.       Neurological: She is alert and oriented to person, place, and time.    Skin: Skin is warm and dry. No erythema. No pallor.    Psychiatric: She has a normal mood and affect.        Laboratory:  CBC:   Recent Labs   Lab 05/05/23 1908   WBC 14.60*   HGB 11.9*   HCT 36.5*   *     Recent Labs   Lab 05/05/23 1908      K 3.5      CO2 21*   BUN 7   CREATININE 0.7   *   PROT 6.9   BILITOT 0.2   ALKPHOS 99   ALT 35   AST 21        Diagnostic Results:      Assessment/Plan:     * Postoperative intra-abdominal abscess  - On arrival patient non toxic appearing, VSS. Abdominal exam benign. Limited  exam performed with sterile speculum. Poorly tolerated due to patient discomfort. Thin purulent discharge noted. No bowel evisceration or herniation. Cuff not well visualized due to redundant vaginal tissue. SVE deferred due to patient discomfort, low concern for vaginal cuff dehiscence in absence of vaginal bleeding.     - CBC 15/12/36.5/499    - CMP Cr 0.7 K 3.5    - Blood and urine cx pending    - Vaginal cx aerobic and anaerobic pending    - Hgb A1c pending  - CT AP: 3.6x4cm air and fluid collection in cul de sac      Plan:   - Initiated IV Vanc/Zosyn for broad spectrum coverage, will narrow as able   - Follow up cultures   - AM CBC, CMP  - Pain regimen: Ibuprofen/Tylenol Q6H, Oxy 5/10 PRN, Dilaudid 0.5mg BTP  - Anti emetics PRN   - Cardiac diet  - Plan to consult IR for  "possible drainage; NPO midnight and PT/INR    - VTE ppx: Lovenox 40mg BID   - Strict I&Os     S/P RA-TLH/BSO/SLND  - Per operative report: complicated by extensive abdominal adhesions and abscess noted along R posterior colpotomy separate from R ureter and sigmoid colon. SurgiFlo placed at colpotomy and in retroperitoneum. EBL 100cc.    - Pathology report: grade 1 stage 1A endometrioid cancer     History of myocardial infarction due to demand ischemia  - Last cardiology note pre op clearance 4/13/2023, non obstructive CAD 30% EKG NSR with no ST changes, ECHO EF normal with grade 1 diastolic dysfunction   - Patient had been recommended ASA, Lipitor 40mg, Lopressor 12.5mg QID, Lisinopril 5mg however patient discontinued "made her feel bad"   - Will consider cardiology consult     Type 2 diabetes mellitus with other specified complication  - Patient denies history of this, denies medications   - Hgb A1c pending     Tobacco abuse  - Nicotine patch PRN     Benign essential hypertension  - Patient denies history of this, denies home medications  - Hydral 10mg IV PRN SBP > 180       Jennifer Burt MD  Gynecologic Oncology  [facility] ([address])  "

## 2023-05-06 NOTE — ASSESSMENT & PLAN NOTE
"- Last cardiology note pre op clearance 4/13/2023, non obstructive CAD 30% EKG NSR with no ST changes, ECHO EF normal with grade 1 diastolic dysfunction   - Patient had been recommended ASA, Lipitor 40mg, Lopressor 12.5mg QID, Lisinopril 5mg however patient discontinued "made her feel bad"   - Will consider cardiology consult   "

## 2023-05-06 NOTE — SUBJECTIVE & OBJECTIVE
Interval History: NAEON. Afebrile. Reports bilateral lower pelvic pain and burning pain with urination. Tolerated PO without nausea vomiting. Passing gas and BM. Ambulating.     Scheduled Meds:   acetaminophen  650 mg Oral Q6H    enoxparin  40 mg Subcutaneous Q12H    ibuprofen  600 mg Oral Q6H    mupirocin   Nasal BID    nicotine  1 patch Transdermal Daily    piperacillin-tazobactam (ZOSYN) IVPB  4.5 g Intravenous Q8H    vancomycin (VANCOCIN) IVPB  1,750 mg Intravenous Q12H     Continuous Infusions:  PRN Meds:hydrALAZINE, HYDROmorphone, ondansetron, oxyCODONE, oxyCODONE, prochlorperazine, sodium chloride 0.9%, Pharmacy to dose Vancomycin consult **AND** vancomycin - pharmacy to dose    Review of patient's allergies indicates:  No Known Allergies    Objective:     Vital Signs (Most Recent):  Temp: 98.1 °F (36.7 °C) (05/06/23 0410)  Pulse: (!) 53 (05/06/23 0410)  Resp: 18 (05/06/23 0410)  BP: 122/60 (05/06/23 0410)  SpO2: 98 % (05/06/23 0410)   Vital Signs (24h Range):  Temp:  [97.7 °F (36.5 °C)-98.6 °F (37 °C)] 98.1 °F (36.7 °C)  Pulse:  [53-82] 53  Resp:  [18] 18  SpO2:  [97 %-99 %] 98 %  BP: (119-136)/(57-85) 122/60     Weight: 109.8 kg (242 lb 1 oz)  Body mass index is 44.27 kg/m².    Intake/Output - Last 3 Shifts         05/04 0700  05/05 0659 05/05 0700  05/06 0659 05/06 0700  05/07 0659    IV Piggyback  310.8     Total Intake(mL/kg)  310.8 (2.8)     Urine (mL/kg/hr)  200     Total Output  200     Net  +110.8                          Physical Exam:   Constitutional: She is oriented to person, place, and time. She appears well-developed and well-nourished. No distress.    HENT:   Head: Normocephalic and atraumatic.    Eyes: EOM are normal.     Cardiovascular:  Normal rate and intact distal pulses.      Exam reveals no edema.        Pulmonary/Chest: Effort normal and breath sounds normal. No respiratory distress.        Abdominal: Soft. She exhibits no distension. There is no abdominal tenderness.   Tender to  palpation in bilateral lower quadrants, no peritoneal signs              Musculoskeletal: Normal range of motion and moves all extremeties. No tenderness or edema.       Neurological: She is alert and oriented to person, place, and time.    Skin: Skin is warm and dry. No erythema. No pallor.    Psychiatric: She has a normal mood and affect.        Lines/Drains/Airways       Peripheral Intravenous Line  Duration                  Peripheral IV - Single Lumen 05/06/23 0313 22 G Posterior;Right Hand <1 day                    Laboratory:  Recent Labs   Lab 05/05/23 1908 05/06/23  0520   WBC 14.60* 12.60   HGB 11.9* 11.6*   HCT 36.5* 36.1*   MCV 91 91   * 462*      Recent Labs   Lab 05/05/23 1908 05/06/23  0520    139   K 3.5 3.8    109   CO2 21* 22*   BUN 7 8   CREATININE 0.7 0.8   * 202*   PROT 6.9 6.4   BILITOT 0.2 0.2   ALKPHOS 99 96   ALT 35 31   AST 21 19

## 2023-05-06 NOTE — ASSESSMENT & PLAN NOTE
- Per operative report: complicated by extensive abdominal adhesions and abscess noted along R posterior colpotomy separate from R ureter and sigmoid colon. SurgiFlo placed at colpotomy and in retroperitoneum. EBL 100cc.    - Pathology report: grade 1 stage 1A endometrioid cancer

## 2023-05-06 NOTE — HPI
Silvana Coronado is a 42 y.o. grade 1 stage 1a endometrioid cancer POD#9  RA-TLH/BSO/SNLD/JASVIR with Dr. Hathaway. PMHx h/o STEMI x3, obesity, HTN, tobacco usage, DM2.     Patient underwent surgical staging with Dr Hathaway on 4/26/23. Surgery complicated by extensive abdominal adhesions and presence of abscess traversing length of colpotomy along posterior edge. Surgifo was applied to colpotomy with adequate hemostasis. Pathology confirmed grade 1 stage IA endometrioid cancer.     Patient initially presented to OSH for subjective fevers, nausea, vomiting, diarrhea, pelvic cramps x5 days and vaginal discharge x2 days. At OSH afebrile VSS. WBC 17, lactate 2.5. Given 1L LR and IV Vanc. CT AP with air fluid collection with mild rim enhancement in cul de sac 3.6x4cm. Transferred to [facility] for higher level of care.     On admission patient reports she has had subjective fevers, nausea vomiting and diarrhea for the last 5 days. Initially she suspected a food borne illness. Then 2 days ago she notice a purulent foul smelling vaginal discharge requiring her to wear a pad. Denies any vaginal bleeding. Maintains pelvic rest. Denies any dysuria however reports pelvic pressure with urinating. Reports her post operative pain had been well controlled on scheduled ibuprofen/Tylenol however her pelvic cramps started with the diarrhea. Denies any hematochezia/melena. Denies any CP, SOB.

## 2023-05-06 NOTE — SUBJECTIVE & OBJECTIVE
Past Medical History:   Diagnosis Date    Asthma     Back pain     Heart attack 2020    x3     Past Surgical History:   Procedure Laterality Date     SECTION      GALLBLADDER SURGERY      LYMPH NODE BIOPSY N/A 2023    Procedure: BIOPSY, LYMPH NODE;  Surgeon: Prasad Hathaway MD;  Location: [facility];  Service: OB/GYN;  Laterality: N/A;    MAPPING, LYMPH NODE, SENTINEL N/A 2023    Procedure: MAPPING, LYMPH NODE, SENTINEL;  Surgeon: Prasad Hathaway MD;  Location: [facility];  Service: OB/GYN;  Laterality: N/A;    ROBOT-ASSISTED LAPAROSCOPIC ABDOMINAL HYSTERECTOMY USING DA ALLEGRA XI N/A 2023    Procedure: XI ROBOTIC HYSTERECTOMY;  Surgeon: Prasad Hathaway MD;  Location: [facility];  Service: OB/GYN;  Laterality: N/A;  3.5 HOURS    ROBOT-ASSISTED LAPAROSCOPIC LYSIS OF ADHESIONS USING DA ALLEGRA XI N/A 2023    Procedure: XI ROBOTIC LYSIS, ADHESIONS;  Surgeon: Prasad Hathaway MD;  Location: [facility];  Service: OB/GYN;  Laterality: N/A;    ROBOT-ASSISTED LAPAROSCOPIC SALPINGO-OOPHORECTOMY USING DA ALLEGRA XI Bilateral 2023    Procedure: XI ROBOTIC SALPINGO-OOPHORECTOMY;  Surgeon: Prasad Hathaway MD;  Location: [facility];  Service: OB/GYN;  Laterality: Bilateral;    TUBAL LIGATION       Family History    None       Tobacco Use    Smoking status: Every Day     Packs/day: 1.50     Years: 15.00     Pack years: 22.50     Types: Cigarettes    Smokeless tobacco: Not on file   Substance and Sexual Activity    Alcohol use: Never    Drug use: Never    Sexual activity: Yes     Partners: Male     Birth control/protection: None       PTA Medications   Medication Sig    acetaminophen (TYLENOL) 500 MG tablet Take 1 tablet (500 mg total) by mouth every 6 (six) hours as needed for Pain. Take alternating with ibuprofen hours scheduled for the 1st week after surgery    docusate sodium (COLACE) 100 MG capsule Take 1 capsule (100 mg total) by mouth 2 (two) times daily as needed for Constipation.     ibuprofen (ADVIL,MOTRIN) 600 MG tablet Take 1 tablet (600 mg total) by mouth every 6 (six) hours as needed for Pain. Take scheduled alternating with tylenol every 6 hours for the first week after surgery    oxyCODONE-acetaminophen (PERCOCET) 5-325 mg per tablet Take 1 tablet by mouth every 4 (four) hours as needed for Pain.       Review of patient's allergies indicates:  No Known Allergies    Review of Systems   Constitutional:  Negative for activity change, appetite change, chills and fever.   Respiratory:  Negative for cough and shortness of breath.    Cardiovascular:  Negative for chest pain.   Gastrointestinal:  Positive for abdominal pain, diarrhea, nausea and vomiting. Negative for constipation.   Genitourinary:  Positive for vaginal discharge. Negative for dysuria, frequency and vaginal bleeding.   Musculoskeletal:  Negative for back pain.   Neurological:  Negative for numbness and headaches.   Psychiatric/Behavioral:  Negative for depression. The patient is not nervous/anxious.     Objective:     Vital Signs (Most Recent):  Temp: 98.1 °F (36.7 °C) (05/05/23 1833)  Pulse: 82 (05/05/23 1833)  Resp: 18 (05/05/23 1833)  BP: (!) 119/57 (05/05/23 1833)  SpO2: 97 % (05/05/23 1833)   Vital Signs (24h Range):  Temp:  [98.1 °F (36.7 °C)-98.6 °F (37 °C)] 98.1 °F (36.7 °C)  Pulse:  [72-82] 82  Resp:  [18] 18  SpO2:  [97 %-99 %] 97 %  BP: (119-136)/(57-85) 119/57     Weight: 109.8 kg (242 lb 1 oz)  Body mass index is 44.27 kg/m².       Physical Exam:   Constitutional: She is oriented to person, place, and time. She appears well-developed and well-nourished. No distress.    HENT:   Head: Normocephalic and atraumatic.    Eyes: EOM are normal.     Cardiovascular:  Normal rate and intact distal pulses.      Exam reveals no edema.        Pulmonary/Chest: Effort normal and breath sounds normal. No respiratory distress.        Abdominal: Soft. She exhibits abdominal incision. She exhibits no distension. There is no abdominal  tenderness.   Robotic port sites x5 c/d/I      Genitourinary:    Genitourinary Comments: Limited  exam performed with sterile speculum. Poorly tolerated due to patient discomfort. Thin purulent discharge noted. No bowel evisceration or herniation. Cuff not well visualized due to redundant vaginal tissue. SVE deferred due to patient discomfort. No vaginal bleeding.               Musculoskeletal: Normal range of motion and moves all extremeties. No tenderness or edema.       Neurological: She is alert and oriented to person, place, and time.    Skin: Skin is warm and dry. No erythema. No pallor.    Psychiatric: She has a normal mood and affect.        Laboratory:  CBC:   Recent Labs   Lab 05/05/23 1908   WBC 14.60*   HGB 11.9*   HCT 36.5*   *     Recent Labs   Lab 05/05/23 1908      K 3.5      CO2 21*   BUN 7   CREATININE 0.7   *   PROT 6.9   BILITOT 0.2   ALKPHOS 99   ALT 35   AST 21        Diagnostic Results:

## 2023-05-06 NOTE — ASSESSMENT & PLAN NOTE
- On arrival patient non toxic appearing, VSS. Abdominal exam benign. Limited  exam performed with sterile speculum. Poorly tolerated due to patient discomfort. Thin purulent discharge noted. No bowel evisceration or herniation. Cuff not well visualized due to redundant vaginal tissue. SVE deferred due to patient discomfort, low concern for vaginal cuff dehiscence in absence of vaginal bleeding.     - CBC 15/12/36.5/499    - CMP Cr 0.7 K 3.5    - Blood and urine cx pending    - Vaginal cx aerobic and anaerobic pending    - Hgb A1c pending  - CT AP: 3.6x4cm air and fluid collection in cul de sac      Plan:   - Initiated IV Vanc/Zosyn for broad spectrum coverage, will narrow as able   - Follow up cultures   - AM CBC, CMP  - Pain regimen: Ibuprofen/Tylenol Q6H, Oxy 5/10 PRN, Dilaudid 0.5mg BTP  - Anti emetics PRN   - Cardiac diet  - Plan to consult IR for possible drainage; NPO midnight and PT/INR    - VTE ppx: Lovenox 40mg BID   - Strict I&Os

## 2023-05-06 NOTE — PROGRESS NOTES
[facility] ([address])  Gynecologic Oncology  Progress Note      Patient Name: Silvana Coronado  MRN: [id number]  Admission Date: 5/5/2023  Hospital Length of Stay: 1 days  Attending Provider: Rivera Seay MD  Primary Care Provider: Jacky Serrato PA-C  Principal Problem: Postoperative intra-abdominal abscess    Follow-up For: * No surgery found *  Post-Operative Day:    Subjective:      History of Present Illness:  Silvana Coronado is a 42 y.o. grade 1 stage 1a endometrioid cancer POD#9  RA-TLH/BSO/SNLD/JASVIR with Dr. Hathaway. PMHx h/o STEMI x3, obesity, HTN, tobacco usage, DM2.     Patient underwent surgical staging with Dr Hathaway on 4/26/23. Surgery complicated by extensive abdominal adhesions and presence of abscess traversing length of colpotomy along posterior edge. Surgifo was applied to colpotomy with adequate hemostasis. Pathology confirmed grade 1 stage IA endometrioid cancer.     Patient initially presented to OSH for subjective fevers, nausea, vomiting, diarrhea, pelvic cramps x5 days and vaginal discharge x2 days. At OSH afebrile VSS. WBC 17, lactate 2.5. Given 1L LR and IV Vanc. CT AP with air fluid collection with mild rim enhancement in cul de sac 3.6x4cm. Transferred to [facility] for higher level of care.     On admission patient reports she has had subjective fevers, nausea vomiting and diarrhea for the last 5 days. Initially she suspected a food borne illness. Then 2 days ago she notice a purulent foul smelling vaginal discharge requiring her to wear a pad. Denies any vaginal bleeding. Maintains pelvic rest. Denies any dysuria however reports pelvic pressure with urinating. Reports her post operative pain had been well controlled on scheduled ibuprofen/Tylenol however her pelvic cramps started with the diarrhea. Denies any hematochezia/melena. Denies any CP, SOB.       Hospital Course:  05/05/2023 HD#1/POD#9: Admitted for post op intra abdominal abscess seen on CT  AP at OSH (3.6x4cm). Blood, urine and vaginal cx obtained. IV Vanc/Zosyn initaited. WBC 15.       Interval History: NAEON. Afebrile. Reports bilateral lower pelvic pain and burning pain with urination. Tolerated PO without nausea vomiting. Passing gas and BM. Ambulating.     Scheduled Meds:   acetaminophen  650 mg Oral Q6H    enoxparin  40 mg Subcutaneous Q12H    ibuprofen  600 mg Oral Q6H    mupirocin   Nasal BID    nicotine  1 patch Transdermal Daily    piperacillin-tazobactam (ZOSYN) IVPB  4.5 g Intravenous Q8H    vancomycin (VANCOCIN) IVPB  1,750 mg Intravenous Q12H     Continuous Infusions:  PRN Meds:hydrALAZINE, HYDROmorphone, ondansetron, oxyCODONE, oxyCODONE, prochlorperazine, sodium chloride 0.9%, Pharmacy to dose Vancomycin consult **AND** vancomycin - pharmacy to dose    Review of patient's allergies indicates:  No Known Allergies    Objective:     Vital Signs (Most Recent):  Temp: 98.1 °F (36.7 °C) (05/06/23 0410)  Pulse: (!) 53 (05/06/23 0410)  Resp: 18 (05/06/23 0410)  BP: 122/60 (05/06/23 0410)  SpO2: 98 % (05/06/23 0410)   Vital Signs (24h Range):  Temp:  [97.7 °F (36.5 °C)-98.6 °F (37 °C)] 98.1 °F (36.7 °C)  Pulse:  [53-82] 53  Resp:  [18] 18  SpO2:  [97 %-99 %] 98 %  BP: (119-136)/(57-85) 122/60     Weight: 109.8 kg (242 lb 1 oz)  Body mass index is 44.27 kg/m².    Intake/Output - Last 3 Shifts         05/04 0700  05/05 0659 05/05 0700 05/06 0659 05/06 0700 05/07 0659    IV Piggyback  310.8     Total Intake(mL/kg)  310.8 (2.8)     Urine (mL/kg/hr)  200     Total Output  200     Net  +110.8                          Physical Exam:   Constitutional: She is oriented to person, place, and time. She appears well-developed and well-nourished. No distress.    HENT:   Head: Normocephalic and atraumatic.    Eyes: EOM are normal.     Cardiovascular:  Normal rate and intact distal pulses.      Exam reveals no edema.        Pulmonary/Chest: Effort normal and breath sounds normal. No respiratory  distress.        Abdominal: Soft. She exhibits no distension. There is no abdominal tenderness.   Tender to palpation in bilateral lower quadrants, no peritoneal signs              Musculoskeletal: Normal range of motion and moves all extremeties. No tenderness or edema.       Neurological: She is alert and oriented to person, place, and time.    Skin: Skin is warm and dry. No erythema. No pallor.    Psychiatric: She has a normal mood and affect.        Lines/Drains/Airways       Peripheral Intravenous Line  Duration                  Peripheral IV - Single Lumen 05/06/23 0313 22 G Posterior;Right Hand <1 day                    Laboratory:  Recent Labs   Lab 05/05/23 1908 05/06/23  0520   WBC 14.60* 12.60   HGB 11.9* 11.6*   HCT 36.5* 36.1*   MCV 91 91   * 462*      Recent Labs   Lab 05/05/23 1908 05/06/23  0520    139   K 3.5 3.8    109   CO2 21* 22*   BUN 7 8   CREATININE 0.7 0.8   * 202*   PROT 6.9 6.4   BILITOT 0.2 0.2   ALKPHOS 99 96   ALT 35 31   AST 21 19          Assessment/Plan:     * Postoperative intra-abdominal abscess  - On arrival patient non toxic appearing, VSS. Abdominal exam benign. Limited  exam performed with sterile speculum. Poorly tolerated due to patient discomfort. Thin purulent discharge noted. No bowel evisceration or herniation. Cuff not well visualized due to redundant vaginal tissue. SVE deferred due to patient discomfort, low concern for vaginal cuff dehiscence in absence of vaginal bleeding.     - CBC 15/12/36.5/499    - CMP Cr 0.7 K 3.5    - Blood and urine cx pending    - Vaginal cx aerobic and anaerobic pending    - Hgb A1c pending  - CT AP: 3.6x4cm air and fluid collection in cul de sac      Plan:   - Initiated IV Vanc/Zosyn for broad spectrum coverage, will narrow as able   - Follow up cultures   - AM CBC, CMP   - WBC down trending 17 > 14 > 12   - Pain regimen: Ibuprofen/Tylenol Q6H, Oxy 5/10 PRN, Dilaudid 0.5mg BTP  - Anti emetics PRN   - Cardiac  "diet  - Plan to consult IR for possible drainage; NPO midnight and PT/INR    - VTE ppx: Lovenox 40mg BID   - Strict I&Os     S/P RA-TLH/BSO/SLND  - Per operative report: complicated by extensive abdominal adhesions and abscess noted along R posterior colpotomy separate from R ureter and sigmoid colon. SurgiFlo placed at colpotomy and in retroperitoneum. EBL 100cc.    - Pathology report: grade 1 stage 1A endometrioid cancer     History of myocardial infarction due to demand ischemia  - Last cardiology note pre op clearance 4/13/2023, non obstructive CAD 30% EKG NSR with no ST changes, ECHO EF normal with grade 1 diastolic dysfunction   - Patient had been recommended ASA, Lipitor 40mg, Lopressor 12.5mg QID, Lisinopril 5mg however patient discontinued "made her feel bad"   - Will consider cardiology consult     Type 2 diabetes mellitus with other specified complication  - Patient denies history of this, denies medications   - Hgb A1c pending     Tobacco abuse  - Nicotine patch PRN     Benign essential hypertension  - Patient denies history of this, denies home medications  - Hydral 10mg IV PRN SBP > 180       VTE Risk Mitigation (From admission, onward)         Ordered     enoxaparin injection 40 mg  Every 12 hours         05/05/23 1845     IP VTE HIGH RISK PATIENT  Once         05/05/23 1845     Place sequential compression device  Until discontinued         05/05/23 1845                Was spencer catheter removed? Yes     Jennifer Burt MD  Gynecologic Oncology  [facility] ([address])  "

## 2023-05-06 NOTE — NURSING
Nurses Note -- 4 Eyes      5/6/2023   4:59 AM      Skin assessed during: Admit      [] No Altered Skin Integrity Present    []Prevention Measures Documented      [x] Yes- Altered Skin Integrity Present or Discovered   [x] LDA Added if Not in Epic (Describe Wound)   [x] New Altered Skin Integrity was Present on Admit and Documented in LDA   [] Wound Image Taken    Wound Care Consulted? No    Attending Nurse:  Lou Ruiz LPN     Second RN/Staff Member:  ROGERIO Cardenas      Patient admitted for post op infection. LDA has lap sites to abdomen. Gyn/Onc following.

## 2023-05-06 NOTE — PLAN OF CARE
Patient is AAOx4. Family at bedside. Patient reported pain during the night and medications were administered per orders. Lap sites with bruising and open to air. Up to bathroom independently. No acute events to note. Patient resting comfortably at present. Bed locked in lowest position with side rails up x 2. Call light within reach of patient. Will continue purposeful rounding.

## 2023-05-06 NOTE — PROGRESS NOTES
Pharmacokinetic Initial Assessment: IV Vancomycin    Assessment/Plan:    Initiate intravenous vancomycin with loading dose of 2000 mg once followed by a maintenance dose of vancomycin 1750mg IV every 12 hours  Desired empiric serum trough concentration is 10 to 20 mcg/mL  Draw vancomycin trough level 30 min prior to fourth dose on 5/7 at approximately 0930  Pharmacy will continue to follow and monitor vancomycin.      Please contact pharmacy at extension [phone] with any questions regarding this assessment.     Thank you for the consult,   Yumiko Rustyjose a       Patient brief summary:  Silvana Coronado is a 42 y.o. female initiated on antimicrobial therapy with IV Vancomycin for treatment of suspected intra-abdominal infection    Drug Allergies:   Review of patient's allergies indicates:  No Known Allergies    Actual Body Weight:   109.8 kg    Renal Function:   Estimated Creatinine Clearance: 122.3 mL/min (based on SCr of 0.7 mg/dL).,     Dialysis Method (if applicable):  N/A    CBC (last 72 hours):  Recent Labs   Lab Result Units 05/05/23  1908   WBC K/uL 14.60*   Hemoglobin g/dL 11.9*   Hematocrit % 36.5*   Platelets K/uL 499*   Gran % % 63.4   Lymph % % 21.2   Mono % % 5.9   Eosinophil % % 8.4*   Basophil % % 0.3   Differential Method  Automated       Metabolic Panel (last 72 hours):  Recent Labs   Lab Result Units 05/05/23  1908   Sodium mmol/L 140   Potassium mmol/L 3.5   Chloride mmol/L 110   CO2 mmol/L 21*   Glucose mg/dL 138*   BUN mg/dL 7   Creatinine mg/dL 0.7   Albumin g/dL 2.9*   Total Bilirubin mg/dL 0.2   Alkaline Phosphatase U/L 99   AST U/L 21   ALT U/L 35       Drug levels (last 3 results):  No results for input(s): VANCOMYCINRA, VANCORANDOM, VANCOMYCINPE, VANCOPEAK, VANCOMYCINTR, VANCOTROUGH in the last 72 hours.    Microbiologic Results:  Microbiology Results (last 7 days)       Procedure Component Value Units Date/Time    Culture, Anaerobe [[id number]] Collected: 05/05/23 2040    Order Status:  Sent Specimen: Abscess from Groin Updated: 05/05/23 2120    Aerobic culture [[id number]] Collected: 05/05/23 2040    Order Status: Sent Specimen: Abscess from Groin Updated: 05/05/23 2119    Blood culture [[id number]] Collected: 05/05/23 1908    Order Status: Sent Specimen: Blood Updated: 05/05/23 1908    Blood culture [[id number]] Collected: 05/05/23 1908    Order Status: Sent Specimen: Blood from Antecubital, Left Arm Updated: 05/05/23 1908    Urine Culture High Risk [[id number]]     Order Status: No result Specimen: Urine, Catheterized

## 2023-05-06 NOTE — ASSESSMENT & PLAN NOTE
- On arrival patient non toxic appearing, VSS. Abdominal exam benign. Limited  exam performed with sterile speculum. Poorly tolerated due to patient discomfort. Thin purulent discharge noted. No bowel evisceration or herniation. Cuff not well visualized due to redundant vaginal tissue. SVE deferred due to patient discomfort, low concern for vaginal cuff dehiscence in absence of vaginal bleeding.     - CBC 15/12/36.5/499    - CMP Cr 0.7 K 3.5    - Blood and urine cx pending    - Vaginal cx aerobic and anaerobic pending    - Hgb A1c pending  - CT AP: 3.6x4cm air and fluid collection in cul de sac      Plan:   - Initiated IV Vanc/Zosyn for broad spectrum coverage, will narrow as able   - Follow up cultures   - AM CBC, CMP   - WBC down trending 17 > 14 > 12   - Pain regimen: Ibuprofen/Tylenol Q6H, Oxy 5/10 PRN, Dilaudid 0.5mg BTP  - Anti emetics PRN   - Cardiac diet  - Plan to consult IR for possible drainage; NPO midnight and PT/INR    - VTE ppx: Lovenox 40mg BID   - Strict I&Os

## 2023-05-06 NOTE — HOSPITAL COURSE
05/05/2023 HD#1/POD#9: Admitted for post op intra abdominal abscess seen on CT AP at OSH (3.6x4cm). Blood, urine and vaginal cx obtained. IV Vanc/Zosyn initaited. WBC 15.   5/6/2023 HD#2/POD#10: Pain unchanged, WBC 12. D/c Vanc, added Flagyl. IR not available on weekend for non emergent drainage. SSE repeated, vaginal cuff intact. Declining Lovenox.   05/07/2023 HD#3/POD#11: Pain unchanged. Cont Zosyn/Flagyl. Plan for NPO midnight will consult IR on Monday for drainage. Adding oxybutnin 5mg TID.   05/08/2023 HD#4/POD#12: Pain unchanged however patient did not receive PO pain medication overnight. Bowel function present. UOP 400cc. AM CBC, PT/INR pending. IR consulted and recommend no drainage given small size. Labs remain stable and clinically patient remains stable. Plan to dc home with augmentin and close f/u on 5/11 with primary gyn onc.

## 2023-05-07 PROBLEM — N39.0 UTI (URINARY TRACT INFECTION): Status: ACTIVE | Noted: 2023-05-07

## 2023-05-07 LAB
BACTERIA UR CULT: NO GROWTH
POCT GLUCOSE: 114 MG/DL (ref 70–110)
POCT GLUCOSE: 115 MG/DL (ref 70–110)
POCT GLUCOSE: 141 MG/DL (ref 70–110)

## 2023-05-07 PROCEDURE — 11000001 HC ACUTE MED/SURG PRIVATE ROOM

## 2023-05-07 PROCEDURE — S0030 INJECTION, METRONIDAZOLE: HCPCS

## 2023-05-07 PROCEDURE — 99024 PR POST-OP FOLLOW-UP VISIT: ICD-10-PCS | Mod: ,,, | Performed by: OBSTETRICS & GYNECOLOGY

## 2023-05-07 PROCEDURE — 99024 POSTOP FOLLOW-UP VISIT: CPT | Mod: ,,, | Performed by: OBSTETRICS & GYNECOLOGY

## 2023-05-07 PROCEDURE — 63600175 PHARM REV CODE 636 W HCPCS

## 2023-05-07 PROCEDURE — 25000003 PHARM REV CODE 250

## 2023-05-07 RX ORDER — GLUCAGON 1 MG
1 KIT INJECTION
Status: DISCONTINUED | OUTPATIENT
Start: 2023-05-07 | End: 2023-05-08 | Stop reason: HOSPADM

## 2023-05-07 RX ORDER — DEXTROSE 40 %
15 GEL (GRAM) ORAL
Status: DISCONTINUED | OUTPATIENT
Start: 2023-05-07 | End: 2023-05-08 | Stop reason: HOSPADM

## 2023-05-07 RX ORDER — OXYBUTYNIN CHLORIDE 5 MG/1
5 TABLET ORAL 3 TIMES DAILY
Status: DISCONTINUED | OUTPATIENT
Start: 2023-05-07 | End: 2023-05-08 | Stop reason: HOSPADM

## 2023-05-07 RX ORDER — INSULIN ASPART 100 [IU]/ML
0-5 INJECTION, SOLUTION INTRAVENOUS; SUBCUTANEOUS
Status: DISCONTINUED | OUTPATIENT
Start: 2023-05-07 | End: 2023-05-08 | Stop reason: HOSPADM

## 2023-05-07 RX ORDER — METHOCARBAMOL 500 MG/1
1000 TABLET, FILM COATED ORAL 4 TIMES DAILY
Status: DISCONTINUED | OUTPATIENT
Start: 2023-05-07 | End: 2023-05-08 | Stop reason: HOSPADM

## 2023-05-07 RX ORDER — DEXTROSE 40 %
30 GEL (GRAM) ORAL
Status: DISCONTINUED | OUTPATIENT
Start: 2023-05-07 | End: 2023-05-08 | Stop reason: HOSPADM

## 2023-05-07 RX ORDER — SODIUM CHLORIDE, SODIUM LACTATE, POTASSIUM CHLORIDE, CALCIUM CHLORIDE 600; 310; 30; 20 MG/100ML; MG/100ML; MG/100ML; MG/100ML
INJECTION, SOLUTION INTRAVENOUS CONTINUOUS
Status: DISCONTINUED | OUTPATIENT
Start: 2023-05-08 | End: 2023-05-08

## 2023-05-07 RX ADMIN — OXYCODONE HYDROCHLORIDE 10 MG: 5 TABLET ORAL at 05:05

## 2023-05-07 RX ADMIN — KETOROLAC TROMETHAMINE 30 MG: 30 INJECTION, SOLUTION INTRAMUSCULAR; INTRAVENOUS at 12:05

## 2023-05-07 RX ADMIN — ONDANSETRON 8 MG: 8 TABLET, ORALLY DISINTEGRATING ORAL at 05:05

## 2023-05-07 RX ADMIN — ACETAMINOPHEN 650 MG: 325 TABLET, FILM COATED ORAL at 09:05

## 2023-05-07 RX ADMIN — METHOCARBAMOL 1000 MG: 500 TABLET ORAL at 05:05

## 2023-05-07 RX ADMIN — PIPERACILLIN AND TAZOBACTAM 4.5 G: 4; .5 INJECTION, POWDER, LYOPHILIZED, FOR SOLUTION INTRAVENOUS; PARENTERAL at 10:05

## 2023-05-07 RX ADMIN — IBUPROFEN 600 MG: 600 TABLET, FILM COATED ORAL at 05:05

## 2023-05-07 RX ADMIN — ACETAMINOPHEN 650 MG: 325 TABLET, FILM COATED ORAL at 05:05

## 2023-05-07 RX ADMIN — PIPERACILLIN AND TAZOBACTAM 4.5 G: 4; .5 INJECTION, POWDER, LYOPHILIZED, FOR SOLUTION INTRAVENOUS; PARENTERAL at 01:05

## 2023-05-07 RX ADMIN — OXYBUTYNIN CHLORIDE 5 MG: 5 TABLET ORAL at 01:05

## 2023-05-07 RX ADMIN — METHOCARBAMOL 1000 MG: 500 TABLET ORAL at 09:05

## 2023-05-07 RX ADMIN — METHOCARBAMOL 1000 MG: 500 TABLET ORAL at 01:05

## 2023-05-07 RX ADMIN — KETOROLAC TROMETHAMINE 30 MG: 30 INJECTION, SOLUTION INTRAMUSCULAR; INTRAVENOUS at 06:05

## 2023-05-07 RX ADMIN — METRONIDAZOLE 500 MG: 5 INJECTION, SOLUTION INTRAVENOUS at 09:05

## 2023-05-07 RX ADMIN — MUPIROCIN: 20 OINTMENT TOPICAL at 10:05

## 2023-05-07 RX ADMIN — PIPERACILLIN AND TAZOBACTAM 4.5 G: 4; .5 INJECTION, POWDER, LYOPHILIZED, FOR SOLUTION INTRAVENOUS; PARENTERAL at 06:05

## 2023-05-07 RX ADMIN — OXYBUTYNIN CHLORIDE 5 MG: 5 TABLET ORAL at 05:05

## 2023-05-07 RX ADMIN — METRONIDAZOLE 500 MG: 5 INJECTION, SOLUTION INTRAVENOUS at 12:05

## 2023-05-07 RX ADMIN — IBUPROFEN 600 MG: 600 TABLET, FILM COATED ORAL at 01:05

## 2023-05-07 RX ADMIN — OXYBUTYNIN CHLORIDE 5 MG: 5 TABLET ORAL at 09:05

## 2023-05-07 RX ADMIN — ACETAMINOPHEN 650 MG: 325 TABLET, FILM COATED ORAL at 04:05

## 2023-05-07 RX ADMIN — OXYCODONE HYDROCHLORIDE 10 MG: 5 TABLET ORAL at 10:05

## 2023-05-07 RX ADMIN — HYDROMORPHONE HYDROCHLORIDE 0.5 MG: 0.5 INJECTION, SOLUTION INTRAMUSCULAR; INTRAVENOUS; SUBCUTANEOUS at 12:05

## 2023-05-07 RX ADMIN — HYDROMORPHONE HYDROCHLORIDE 0.5 MG: 0.5 INJECTION, SOLUTION INTRAMUSCULAR; INTRAVENOUS; SUBCUTANEOUS at 11:05

## 2023-05-07 RX ADMIN — MUPIROCIN: 20 OINTMENT TOPICAL at 09:05

## 2023-05-07 RX ADMIN — OXYCODONE HYDROCHLORIDE 5 MG: 5 TABLET ORAL at 04:05

## 2023-05-07 RX ADMIN — OXYCODONE HYDROCHLORIDE 10 MG: 5 TABLET ORAL at 09:05

## 2023-05-07 RX ADMIN — METRONIDAZOLE 500 MG: 5 INJECTION, SOLUTION INTRAVENOUS at 05:05

## 2023-05-07 NOTE — ASSESSMENT & PLAN NOTE
- Patient denies history of this, denies medications   - Hgb A1c 6.4, pre diabetic range   - POCT BG AC and QHS   - SSI

## 2023-05-07 NOTE — ASSESSMENT & PLAN NOTE
"- Last cardiology note pre op clearance 4/13/2023, non obstructive CAD 30% EKG NSR with no ST changes, ECHO EF normal with grade 1 diastolic dysfunction   - Patient had been recommended ASA, Lipitor 40mg, Lopressor 12.5mg QID, Lisinopril 5mg however patient discontinued "made her feel bad"   - No cardiac complaints   "

## 2023-05-07 NOTE — PLAN OF CARE
MSW met with the patient at the bedside. The patient's significant other Thee is also at the bedside.     Patient is alert and oriented with no communication barriers.     Patient denies having DME at home.     Patients PCP is correct on the face sheet. Patient choice pharmacy is bedside delivery.     Patients' family will transport the patient home at discharge.     CM team will continue to follow.      05/07/23 5375   Discharge Assessment   Assessment Type Discharge Planning Assessment   Confirmed/corrected address, phone number and insurance Yes   Confirmed Demographics Correct on Facesheet   Source of Information patient;family;health record   People in Home significant other   Do you expect to return to your current living situation? Yes   Do you have help at home or someone to help you manage your care at home? Yes   Prior to hospitilization cognitive status: Alert/Oriented   Current cognitive status: Alert/Oriented   Equipment Currently Used at Home none   Readmission within 30 days? No   Patient currently being followed by outpatient case management? No   Do you currently have service(s) that help you manage your care at home? No   Do you take prescription medications? Yes   Do you have prescription coverage? Yes   Do you have any problems affording any of your prescribed medications? No   Is the patient taking medications as prescribed? yes   How do you get to doctors appointments? car, drives self;family or friend will provide   Are you on dialysis? No   Do you take coumadin? No   Discharge Plan A Home with family   DME Needed Upon Discharge  none   Discharge Plan discussed with: Spouse/sig other;Patient   Discharge Barriers Identified None

## 2023-05-07 NOTE — ASSESSMENT & PLAN NOTE
- On arrival patient non toxic appearing, VSS. Abdominal exam benign. Limited  exam performed with sterile speculum. Poorly tolerated due to patient discomfort. Thin purulent discharge noted. No bowel evisceration or herniation. Cuff not well visualized due to redundant vaginal tissue. SVE deferred due to patient discomfort, low concern for vaginal cuff dehiscence in absence of vaginal bleeding.     - CBC 15/12/36.5/499    - CMP Cr 0.7 K 3.5    - Blood and urine cx pending    - Vaginal cx aerobic and anaerobic pending    - Hgb A1c pending  - CT AP: 3.6x4cm air and fluid collection in cul de sac      Plan:   - Initiated IV Vanc/Zosyn for broad spectrum coverage, will narrow as able   - Follow up cultures   - AM CBC, CMP   - WBC down trending 17 > 14 > 12   - Pain regimen: Toradol>Ibuprofen/Tylenol Q6H, Oxy 5/10 PRN, Dilaudid 0.5mg BTP; adding methocarbamol 1g QID   - Anti emetics PRN   - Cardiac diet  - Plan to consult IR for possible drainage on Monday; Sunday NPO midnight and CBC PT/INR Monday AM   - VTE ppx: Lovenox 40mg BID, patient declining. Counseled on risks of VTE/DVT/PE given multiple risk factors, still declining. Changing to Apixaban 2.5mg BID   - Strict I&Os

## 2023-05-07 NOTE — PROGRESS NOTES
VSS. R hand SL intact. Carlos Alberto some cardiac diet-- NPO after MN for drain placement in IR tomorrow. Social work consult. Up ad rylee-- ambulated in bacon. Carlos Alberto Oxy 10mg for pain. Old lap sites X5 with dermabond intact & ecchymotic. Zofran ODT given X1. Refused Eliquis. Voiding well. Significant other @ BS-- supportive. Pleasant.

## 2023-05-07 NOTE — PROGRESS NOTES
[facility] ([address])  Gynecologic Oncology  Progress Note      Patient Name: Silvana Coronado  MRN: [id number]  Admission Date: 5/5/2023  Hospital Length of Stay: 2 days  Attending Provider: Rivera Seay MD  Primary Care Provider: Jacky Serrato PA-C  Principal Problem: Postoperative intra-abdominal abscess    Follow-up For: * No surgery found *  Post-Operative Day:    Subjective:      History of Present Illness:  Silvana Coronado is a 42 y.o. grade 1 stage 1a endometrioid cancer POD#9  RA-TLH/BSO/SNLD/JASVIR with Dr. Hathaway. PMHx h/o STEMI x3, obesity, HTN, tobacco usage, DM2.     Patient underwent surgical staging with Dr Hathaway on 4/26/23. Surgery complicated by extensive abdominal adhesions and presence of abscess traversing length of colpotomy along posterior edge. Surgifo was applied to colpotomy with adequate hemostasis. Pathology confirmed grade 1 stage IA endometrioid cancer.     Patient initially presented to OSH for subjective fevers, nausea, vomiting, diarrhea, pelvic cramps x5 days and vaginal discharge x2 days. At OSH afebrile VSS. WBC 17, lactate 2.5. Given 1L LR and IV Vanc. CT AP with air fluid collection with mild rim enhancement in cul de sac 3.6x4cm. Transferred to [facility] for higher level of care.     On admission patient reports she has had subjective fevers, nausea vomiting and diarrhea for the last 5 days. Initially she suspected a food borne illness. Then 2 days ago she notice a purulent foul smelling vaginal discharge requiring her to wear a pad. Denies any vaginal bleeding. Maintains pelvic rest. Denies any dysuria however reports pelvic pressure with urinating. Reports her post operative pain had been well controlled on scheduled ibuprofen/Tylenol however her pelvic cramps started with the diarrhea. Denies any hematochezia/melena. Denies any CP, SOB.       Hospital Course:  05/05/2023 HD#1/POD#9: Admitted for post op intra abdominal abscess seen on CT  "AP at OSH (3.6x4cm). Blood, urine and vaginal cx obtained. IV Vanc/Zosyn initaited. WBC 15.   5/6/2023 HD#2/POD#10: Pain unchanged, WBC 12. D/c Vanc, added Flagyl. IR not available on weekend for non emergent drainage. SSE repeated, vaginal cuff intact. Declining Lovenox.   05/07/2023 HD#3/POD#11: Pain unchanged. Cont Zosyn/Flagyl. Plan for NPO midnight will consult IR on Monday for drainage.       Interval History:  NAEON. Patient reports her pain feels worse; requested Oxy 5x1 Oxy 10x1 and Dilaudid x1 overnight. Tolerating PO without nausea vomiting. Ambulating without difficulty. Declining Lovenox, "don't want to deal with bruises." Reports urinary burning unchanged.     Scheduled Meds:   acetaminophen  650 mg Oral Q6H    apixaban  2.5 mg Oral BID    ibuprofen  600 mg Oral Q6H    ketorolac  30 mg Intravenous Q6H    metronidazole  500 mg Intravenous Q8H    mupirocin   Nasal BID    nicotine  1 patch Transdermal Daily    piperacillin-tazobactam (ZOSYN) IVPB  4.5 g Intravenous Q8H     Continuous Infusions:   [START ON 5/8/2023] lactated ringers       PRN Meds:hydrALAZINE, HYDROmorphone, ondansetron, oxyCODONE, oxyCODONE, prochlorperazine, sodium chloride 0.9%    Review of patient's allergies indicates:  No Known Allergies    Objective:     Vital Signs (Most Recent):  Temp: 97.5 °F (36.4 °C) (05/07/23 0433)  Pulse: (!) 48 (05/07/23 0433)  Resp: 16 (05/07/23 0406)  BP: (!) 122/58 (05/07/23 0433)  SpO2: 100 % (05/07/23 0433)   Vital Signs (24h Range):  Temp:  [97.4 °F (36.3 °C)-97.9 °F (36.6 °C)] 97.5 °F (36.4 °C)  Pulse:  [48-70] 48  Resp:  [12-18] 16  SpO2:  [96 %-100 %] 100 %  BP: (108-148)/(55-75) 122/58     Weight: 109.8 kg (242 lb 1 oz)  Body mass index is 44.27 kg/m².    Intake/Output - Last 3 Shifts         05/05 0700  05/06 0659 05/06 0700  05/07 0659    P.O.  720    IV Piggyback 310.8     Total Intake(mL/kg) 310.8 (2.8) 720 (6.6)    Urine (mL/kg/hr) 200 400 (0.2)    Total Output 200 400    Net +110.8 " +320          Urine Occurrence  2 x                 Physical Exam:   Constitutional: She is oriented to person, place, and time. She appears well-developed and well-nourished. No distress.    HENT:   Head: Normocephalic and atraumatic.    Eyes: EOM are normal.     Cardiovascular:  Normal rate and intact distal pulses.      Exam reveals no edema.        Pulmonary/Chest: Effort normal and breath sounds normal. No respiratory distress.        Abdominal: Soft. She exhibits abdominal incision. She exhibits no distension. There is no abdominal tenderness.   Tender to palpation in bilateral lower quadrants, no peritoneal signs   Robotic port incisions c/d/I x5             Musculoskeletal: Normal range of motion and moves all extremeties. No tenderness or edema.       Neurological: She is alert and oriented to person, place, and time.    Skin: Skin is warm and dry. No erythema. No pallor.    Psychiatric: She has a normal mood and affect.        Lines/Drains/Airways       Peripheral Intravenous Line  Duration                  Peripheral IV - Single Lumen 05/06/23 0313 22 G Posterior;Right Hand 1 day                    Laboratory:  Recent Labs   Lab 05/05/23  1908 05/06/23  0520   WBC 14.60* 12.60   HGB 11.9* 11.6*   HCT 36.5* 36.1*   MCV 91 91   * 462*        Recent Labs   Lab 05/05/23  1908 05/06/23  0520    139   K 3.5 3.8    109   CO2 21* 22*   BUN 7 8   CREATININE 0.7 0.8   * 202*   PROT 6.9 6.4   BILITOT 0.2 0.2   ALKPHOS 99 96   ALT 35 31   AST 21 19            Assessment/Plan:     * Postoperative intra-abdominal abscess  - On arrival patient non toxic appearing, VSS. Abdominal exam benign. Limited  exam performed with sterile speculum. Poorly tolerated due to patient discomfort. Thin purulent discharge noted. No bowel evisceration or herniation. Cuff not well visualized due to redundant vaginal tissue. SVE deferred due to patient discomfort, low concern for vaginal cuff dehiscence in  "absence of vaginal bleeding.     - CBC 15/12/36.5/499    - CMP Cr 0.7 K 3.5    - Blood and urine cx pending    - Vaginal cx aerobic and anaerobic pending    - Hgb A1c pending  - CT AP: 3.6x4cm air and fluid collection in cul de sac      Plan:   - Initiated IV Vanc/Zosyn for broad spectrum coverage, will narrow as able   - Follow up cultures   - AM CBC, CMP   - WBC down trending 17 > 14 > 12   - Pain regimen: Toradol>Ibuprofen/Tylenol Q6H, Oxy 5/10 PRN, Dilaudid 0.5mg BTP; adding methocarbamol 1g QID   - Anti emetics PRN   - Cardiac diet  - Plan to consult IR for possible drainage on Monday; Sunday NPO midnight and CBC PT/INR Monday AM   - VTE ppx: Lovenox 40mg BID, patient declining. Counseled on risks of VTE/DVT/PE given multiple risk factors, still declining. Changing to Apixaban 2.5mg BID   - Strict I&Os     S/P RA-TLH/BSO/SLND  - Per operative report: complicated by extensive abdominal adhesions and abscess noted along R posterior colpotomy separate from R ureter and sigmoid colon. SurgiFlo placed at colpotomy and in retroperitoneum. EBL 100cc.    - Pathology report: grade 1 stage 1A endometrioid cancer     History of myocardial infarction due to demand ischemia  - Last cardiology note pre op clearance 4/13/2023, non obstructive CAD 30% EKG NSR with no ST changes, ECHO EF normal with grade 1 diastolic dysfunction   - Patient had been recommended ASA, Lipitor 40mg, Lopressor 12.5mg QID, Lisinopril 5mg however patient discontinued "made her feel bad"   - No cardiac complaints     Type 2 diabetes mellitus with other specified complication  - Patient denies history of this, denies medications   - Hgb A1c 6.4, pre diabetic range   - POCT BG AC and QHS   - SSI     Tobacco abuse  - Nicotine patch PRN     UTI (urinary tract infection)  - UA 3+ leuks   - Urine culture pending, receiving Zosyn        Benign essential hypertension  - Patient denies history of this, denies home medications  - Hydral 10mg IV PRN SBP > 180 "       VTE Risk Mitigation (From admission, onward)         Ordered     apixaban tablet 2.5 mg  2 times daily         05/07/23 0601     IP VTE HIGH RISK PATIENT  Once         05/05/23 1845     Place sequential compression device  Until discontinued         05/05/23 1845                Was spencer catheter removed? Yes     Jennifer Burt MD  Gynecologic Oncology  [facility] ([address])

## 2023-05-07 NOTE — SUBJECTIVE & OBJECTIVE
"Interval History:  NAEON. Patient reports her pain feels worse; requested Oxy 5x1 Oxy 10x1 and Dilaudid x1 overnight. Tolerating PO without nausea vomiting. Ambulating without difficulty. Declining Lovenox, "don't want to deal with bruises." Reports urinary burning unchanged.     Scheduled Meds:   acetaminophen  650 mg Oral Q6H    apixaban  2.5 mg Oral BID    ibuprofen  600 mg Oral Q6H    ketorolac  30 mg Intravenous Q6H    metronidazole  500 mg Intravenous Q8H    mupirocin   Nasal BID    nicotine  1 patch Transdermal Daily    piperacillin-tazobactam (ZOSYN) IVPB  4.5 g Intravenous Q8H     Continuous Infusions:   [START ON 5/8/2023] lactated ringers       PRN Meds:hydrALAZINE, HYDROmorphone, ondansetron, oxyCODONE, oxyCODONE, prochlorperazine, sodium chloride 0.9%    Review of patient's allergies indicates:  No Known Allergies    Objective:     Vital Signs (Most Recent):  Temp: 97.5 °F (36.4 °C) (05/07/23 0433)  Pulse: (!) 48 (05/07/23 0433)  Resp: 16 (05/07/23 0406)  BP: (!) 122/58 (05/07/23 0433)  SpO2: 100 % (05/07/23 0433)   Vital Signs (24h Range):  Temp:  [97.4 °F (36.3 °C)-97.9 °F (36.6 °C)] 97.5 °F (36.4 °C)  Pulse:  [48-70] 48  Resp:  [12-18] 16  SpO2:  [96 %-100 %] 100 %  BP: (108-148)/(55-75) 122/58     Weight: 109.8 kg (242 lb 1 oz)  Body mass index is 44.27 kg/m².    Intake/Output - Last 3 Shifts         05/05 0700 05/06 0659 05/06 0700  05/07 0659    P.O.  720    IV Piggyback 310.8     Total Intake(mL/kg) 310.8 (2.8) 720 (6.6)    Urine (mL/kg/hr) 200 400 (0.2)    Total Output 200 400    Net +110.8 +320          Urine Occurrence  2 x                  Physical Exam:   Constitutional: She is oriented to person, place, and time. She appears well-developed and well-nourished. No distress.    HENT:   Head: Normocephalic and atraumatic.    Eyes: EOM are normal.     Cardiovascular:  Normal rate and intact distal pulses.      Exam reveals no edema.        Pulmonary/Chest: Effort normal and breath sounds " normal. No respiratory distress.        Abdominal: Soft. She exhibits abdominal incision. She exhibits no distension. There is no abdominal tenderness.   Tender to palpation in bilateral lower quadrants, no peritoneal signs   Robotic port incisions c/d/I x5             Musculoskeletal: Normal range of motion and moves all extremeties. No tenderness or edema.       Neurological: She is alert and oriented to person, place, and time.    Skin: Skin is warm and dry. No erythema. No pallor.    Psychiatric: She has a normal mood and affect.        Lines/Drains/Airways       Peripheral Intravenous Line  Duration                  Peripheral IV - Single Lumen 05/06/23 0313 22 G Posterior;Right Hand 1 day                    Laboratory:  Recent Labs   Lab 05/05/23 1908 05/06/23  0520   WBC 14.60* 12.60   HGB 11.9* 11.6*   HCT 36.5* 36.1*   MCV 91 91   * 462*        Recent Labs   Lab 05/05/23 1908 05/06/23  0520    139   K 3.5 3.8    109   CO2 21* 22*   BUN 7 8   CREATININE 0.7 0.8   * 202*   PROT 6.9 6.4   BILITOT 0.2 0.2   ALKPHOS 99 96   ALT 35 31   AST 21 19

## 2023-05-08 ENCOUNTER — TELEPHONE (OUTPATIENT)
Dept: HEMATOLOGY/ONCOLOGY | Facility: CLINIC | Age: 43
End: 2023-05-08
Payer: MEDICAID

## 2023-05-08 VITALS
WEIGHT: 242.06 LBS | BODY MASS INDEX: 44.55 KG/M2 | TEMPERATURE: 98 F | RESPIRATION RATE: 18 BRPM | DIASTOLIC BLOOD PRESSURE: 62 MMHG | HEIGHT: 62 IN | OXYGEN SATURATION: 100 % | HEART RATE: 50 BPM | SYSTOLIC BLOOD PRESSURE: 129 MMHG

## 2023-05-08 DIAGNOSIS — C54.1 ENDOMETRIAL CANCER: Primary | ICD-10-CM

## 2023-05-08 LAB
BACTERIA SPEC AEROBE CULT: NORMAL
BASOPHILS # BLD AUTO: 0.09 K/UL (ref 0–0.2)
BASOPHILS NFR BLD: 0.6 % (ref 0–1.9)
DIFFERENTIAL METHOD: ABNORMAL
EOSINOPHIL # BLD AUTO: 2 K/UL (ref 0–0.5)
EOSINOPHIL NFR BLD: 12.5 % (ref 0–8)
ERYTHROCYTE [DISTWIDTH] IN BLOOD BY AUTOMATED COUNT: 12.9 % (ref 11.5–14.5)
HCT VFR BLD AUTO: 33.5 % (ref 37–48.5)
HGB BLD-MCNC: 10.7 G/DL (ref 12–16)
IMM GRANULOCYTES # BLD AUTO: 0.12 K/UL (ref 0–0.04)
IMM GRANULOCYTES NFR BLD AUTO: 0.7 % (ref 0–0.5)
INR PPP: 0.9 (ref 0.8–1.2)
LYMPHOCYTES # BLD AUTO: 3.7 K/UL (ref 1–4.8)
LYMPHOCYTES NFR BLD: 22.8 % (ref 18–48)
MCH RBC QN AUTO: 29.4 PG (ref 27–31)
MCHC RBC AUTO-ENTMCNC: 31.9 G/DL (ref 32–36)
MCV RBC AUTO: 92 FL (ref 82–98)
MONOCYTES # BLD AUTO: 1.1 K/UL (ref 0.3–1)
MONOCYTES NFR BLD: 6.6 % (ref 4–15)
NEUTROPHILS # BLD AUTO: 9.2 K/UL (ref 1.8–7.7)
NEUTROPHILS NFR BLD: 56.8 % (ref 38–73)
NRBC BLD-RTO: 0 /100 WBC
PLATELET # BLD AUTO: 461 K/UL (ref 150–450)
PMV BLD AUTO: 8.9 FL (ref 9.2–12.9)
POCT GLUCOSE: 106 MG/DL (ref 70–110)
POCT GLUCOSE: 167 MG/DL (ref 70–110)
PROTHROMBIN TIME: 10.4 SEC (ref 9–12.5)
RBC # BLD AUTO: 3.64 M/UL (ref 4–5.4)
WBC # BLD AUTO: 16.22 K/UL (ref 3.9–12.7)

## 2023-05-08 PROCEDURE — 25000003 PHARM REV CODE 250

## 2023-05-08 PROCEDURE — 99024 PR POST-OP FOLLOW-UP VISIT: ICD-10-PCS | Mod: ,,, | Performed by: OBSTETRICS & GYNECOLOGY

## 2023-05-08 PROCEDURE — 63600175 PHARM REV CODE 636 W HCPCS

## 2023-05-08 PROCEDURE — 85025 COMPLETE CBC W/AUTO DIFF WBC: CPT

## 2023-05-08 PROCEDURE — S0030 INJECTION, METRONIDAZOLE: HCPCS

## 2023-05-08 PROCEDURE — 36415 COLL VENOUS BLD VENIPUNCTURE: CPT

## 2023-05-08 PROCEDURE — 99024 POSTOP FOLLOW-UP VISIT: CPT | Mod: ,,, | Performed by: OBSTETRICS & GYNECOLOGY

## 2023-05-08 PROCEDURE — 85610 PROTHROMBIN TIME: CPT

## 2023-05-08 RX ORDER — IBUPROFEN 600 MG/1
600 TABLET ORAL EVERY 6 HOURS PRN
Qty: 60 TABLET | Refills: 1 | Status: SHIPPED | OUTPATIENT
Start: 2023-05-08

## 2023-05-08 RX ORDER — IBUPROFEN 200 MG
1 TABLET ORAL DAILY
Qty: 84 PATCH | Refills: 3 | Status: SHIPPED | OUTPATIENT
Start: 2023-05-09 | End: 2024-05-08

## 2023-05-08 RX ORDER — AMOXICILLIN AND CLAVULANATE POTASSIUM 875; 125 MG/1; MG/1
1 TABLET, FILM COATED ORAL EVERY 12 HOURS
Qty: 20 TABLET | Refills: 0 | Status: SHIPPED | OUTPATIENT
Start: 2023-05-08 | End: 2023-05-18

## 2023-05-08 RX ORDER — POLYETHYLENE GLYCOL 3350 17 G/17G
17 POWDER, FOR SOLUTION ORAL DAILY
Qty: 510 G | Refills: 0 | Status: SHIPPED | OUTPATIENT
Start: 2023-05-08 | End: 2023-06-07

## 2023-05-08 RX ORDER — METHOCARBAMOL 500 MG/1
1000 TABLET, FILM COATED ORAL 4 TIMES DAILY
Qty: 80 TABLET | Refills: 0 | Status: SHIPPED | OUTPATIENT
Start: 2023-05-08 | End: 2023-05-18

## 2023-05-08 RX ORDER — SENNOSIDES 8.6 MG/1
1 TABLET ORAL 2 TIMES DAILY
Qty: 60 TABLET | Refills: 1 | Status: SHIPPED | OUTPATIENT
Start: 2023-05-08

## 2023-05-08 RX ORDER — OXYCODONE AND ACETAMINOPHEN 5; 325 MG/1; MG/1
1 TABLET ORAL EVERY 4 HOURS PRN
Qty: 10 EACH | Refills: 0 | Status: SHIPPED | OUTPATIENT
Start: 2023-05-08

## 2023-05-08 RX ADMIN — HYDROMORPHONE HYDROCHLORIDE 0.5 MG: 0.5 INJECTION, SOLUTION INTRAMUSCULAR; INTRAVENOUS; SUBCUTANEOUS at 06:05

## 2023-05-08 RX ADMIN — IBUPROFEN 600 MG: 600 TABLET, FILM COATED ORAL at 12:05

## 2023-05-08 RX ADMIN — SODIUM CHLORIDE, POTASSIUM CHLORIDE, SODIUM LACTATE AND CALCIUM CHLORIDE: 600; 310; 30; 20 INJECTION, SOLUTION INTRAVENOUS at 01:05

## 2023-05-08 RX ADMIN — MUPIROCIN: 20 OINTMENT TOPICAL at 08:05

## 2023-05-08 RX ADMIN — METRONIDAZOLE 500 MG: 5 INJECTION, SOLUTION INTRAVENOUS at 04:05

## 2023-05-08 RX ADMIN — METHOCARBAMOL 1000 MG: 500 TABLET ORAL at 12:05

## 2023-05-08 RX ADMIN — OXYCODONE HYDROCHLORIDE 5 MG: 5 TABLET ORAL at 11:05

## 2023-05-08 RX ADMIN — ACETAMINOPHEN 650 MG: 325 TABLET, FILM COATED ORAL at 08:05

## 2023-05-08 RX ADMIN — OXYBUTYNIN CHLORIDE 5 MG: 5 TABLET ORAL at 08:05

## 2023-05-08 RX ADMIN — PIPERACILLIN AND TAZOBACTAM 4.5 G: 4; .5 INJECTION, POWDER, LYOPHILIZED, FOR SOLUTION INTRAVENOUS; PARENTERAL at 06:05

## 2023-05-08 RX ADMIN — METHOCARBAMOL 1000 MG: 500 TABLET ORAL at 08:05

## 2023-05-08 RX ADMIN — IBUPROFEN 600 MG: 600 TABLET, FILM COATED ORAL at 01:05

## 2023-05-08 NOTE — PROGRESS NOTES
[facility] ([address])  Gynecologic Oncology  Progress Note      Patient Name: Silvana Coronado  MRN: [id number]  Admission Date: 5/5/2023  Hospital Length of Stay: 3 days  Attending Provider: Rivera Seay MD  Primary Care Provider: Jacky Serrato PA-C  Principal Problem: Postoperative intra-abdominal abscess    Follow-up For: * No surgery found *  Post-Operative Day:    Subjective:      History of Present Illness:  Silvana Coronado is a 42 y.o. grade 1 stage 1a endometrioid cancer POD#9  RA-TLH/BSO/SNLD/JASVIR with Dr. Hathaway. PMHx h/o STEMI x3, obesity, HTN, tobacco usage, DM2.     Patient underwent surgical staging with Dr Hathaway on 4/26/23. Surgery complicated by extensive abdominal adhesions and presence of abscess traversing length of colpotomy along posterior edge. Surgifo was applied to colpotomy with adequate hemostasis. Pathology confirmed grade 1 stage IA endometrioid cancer.     Patient initially presented to OSH for subjective fevers, nausea, vomiting, diarrhea, pelvic cramps x5 days and vaginal discharge x2 days. At OSH afebrile VSS. WBC 17, lactate 2.5. Given 1L LR and IV Vanc. CT AP with air fluid collection with mild rim enhancement in cul de sac 3.6x4cm. Transferred to [facility] for higher level of care.     On admission patient reports she has had subjective fevers, nausea vomiting and diarrhea for the last 5 days. Initially she suspected a food borne illness. Then 2 days ago she notice a purulent foul smelling vaginal discharge requiring her to wear a pad. Denies any vaginal bleeding. Maintains pelvic rest. Denies any dysuria however reports pelvic pressure with urinating. Reports her post operative pain had been well controlled on scheduled ibuprofen/Tylenol however her pelvic cramps started with the diarrhea. Denies any hematochezia/melena. Denies any CP, SOB.       Hospital Course:  05/05/2023 HD#1/POD#9: Admitted for post op intra abdominal abscess seen on CT  AP at OSH (3.6x4cm). Blood, urine and vaginal cx obtained. IV Vanc/Zosyn initaited. WBC 15.   5/6/2023 HD#2/POD#10: Pain unchanged, WBC 12. D/c Vanc, added Flagyl. IR not available on weekend for non emergent drainage. SSE repeated, vaginal cuff intact. Declining Lovenox.   05/07/2023 HD#3/POD#11: Pain unchanged. Cont Zosyn/Flagyl. Plan for NPO midnight will consult IR on Monday for drainage. Adding oxybutnin 5mg TID.   05/08/2023 HD#4/POD#12: Pain unchanged however patient did not receive PO pain medication overnight. Bowel function present. UOP 400cc. AM CBC, PT/INR pending. IR consulted.       Interval History:  NAEON. Patient reports her pain feels the same. She did not get Tylenol or Ibuprofen overnight, no PRN pain medications. Tolerating PO without vomiting. Endorses nausea. Passing gas, had BM yesterday. Ambulating without difficulty. Declining Apixaban. Denies urinary burning however reports continued pelvic pressure.     Scheduled Meds:   acetaminophen  650 mg Oral Q6H    apixaban  2.5 mg Oral BID    ibuprofen  600 mg Oral Q6H    methocarbamoL  1,000 mg Oral QID    metronidazole  500 mg Intravenous Q8H    mupirocin   Nasal BID    nicotine  1 patch Transdermal Daily    oxybutynin  5 mg Oral TID    piperacillin-tazobactam (ZOSYN) IVPB  4.5 g Intravenous Q8H     Continuous Infusions:   lactated ringers 40 mL/hr at 05/08/23 0134     PRN Meds:dextrose 10%, dextrose 10%, dextrose, dextrose, glucagon (human recombinant), hydrALAZINE, HYDROmorphone, insulin aspart U-100, ondansetron, oxyCODONE, oxyCODONE, prochlorperazine, sodium chloride 0.9%    Review of patient's allergies indicates:  No Known Allergies    Objective:     Vital Signs (Most Recent):  Temp: 98 °F (36.7 °C) (05/08/23 0723)  Pulse: (!) 50 (05/08/23 0723)  Resp: 18 (05/08/23 0723)  BP: 129/62 (05/08/23 0723)  SpO2: 100 % (05/08/23 0723)   Vital Signs (24h Range):  Temp:  [96.3 °F (35.7 °C)-98.9 °F (37.2 °C)] 98 °F (36.7 °C)  Pulse:  [46-66]  50  Resp:  [12-20] 18  SpO2:  [98 %-100 %] 100 %  BP: ()/(53-84) 129/62     Weight: 109.8 kg (242 lb 1 oz)  Body mass index is 44.27 kg/m².    Intake/Output - Last 3 Shifts         05/06 0700  05/07 0659 05/07 0700  05/08 0659 05/08 0700  05/09 0659    P.O. 720 1440     IV Piggyback  897.3     Total Intake(mL/kg) 720 (6.6) 2337.3 (21.3)     Urine (mL/kg/hr) 650 (0.2) 400 (0.2)     Total Output 650 400     Net +70 +1937.3            Urine Occurrence 2 x 3 x                  Physical Exam:   Constitutional: She is oriented to person, place, and time. She appears well-developed and well-nourished. No distress.    HENT:   Head: Normocephalic and atraumatic.    Eyes: EOM are normal.     Cardiovascular:  Normal rate and intact distal pulses.      Exam reveals no edema.        Pulmonary/Chest: Effort normal and breath sounds normal. No respiratory distress.        Abdominal: Soft. She exhibits abdominal incision. She exhibits no distension. There is abdominal tenderness.   Tender to palpation diffusely, non tympanic, soft however limited by habitus, no peritoneal signs   Robotic port incisions c/d/I x5             Musculoskeletal: Normal range of motion and moves all extremeties. No tenderness or edema.       Neurological: She is alert and oriented to person, place, and time.    Skin: Skin is warm and dry. No erythema. No pallor.    Psychiatric: She has a normal mood and affect.        Lines/Drains/Airways       Peripheral Intravenous Line  Duration                  Peripheral IV - Single Lumen 05/06/23 0313 22 G Posterior;Right Hand 2 days         Peripheral IV - Single Lumen 05/08/23 0128 22 G Anterior;Left Forearm <1 day                    Laboratory:  Recent Labs   Lab 05/05/23 1908 05/06/23  0520 05/08/23  0627   WBC 14.60* 12.60 16.22*   HGB 11.9* 11.6* 10.7*   HCT 36.5* 36.1* 33.5*   MCV 91 91 92   * 462* 461*        Recent Labs   Lab 05/05/23 1908 05/06/23  0520    139   K 3.5 3.8    109    CO2 21* 22*   BUN 7 8   CREATININE 0.7 0.8   * 202*   PROT 6.9 6.4   BILITOT 0.2 0.2   ALKPHOS 99 96   ALT 35 31   AST 21 19            Intake/Output - Last 3 Shifts       05/06 0700  05/07 0659 05/07 0700 05/08 0659 05/08 0700 05/09 0659    P.O. 720 1440     IV Piggyback  897.3     Total Intake(mL/kg) 720 (6.6) 2337.3 (21.3)     Urine (mL/kg/hr) 650 (0.2) 400 (0.2)     Total Output 650 400     Net +70 +1937.3            Urine Occurrence 2 x 3 x           Assessment/Plan:     * Postoperative intra-abdominal abscess  - On arrival patient non toxic appearing, VSS. Abdominal exam benign. Limited  exam performed with sterile speculum. Poorly tolerated due to patient discomfort. Thin purulent discharge noted. No bowel evisceration or herniation. Cuff not well visualized due to redundant vaginal tissue. SVE deferred due to patient discomfort, low concern for vaginal cuff dehiscence in absence of vaginal bleeding.     - CBC 15/12/36.5/499    - CMP Cr 0.7 K 3.5    - Blood and urine cx pending    - Vaginal cx aerobic and anaerobic pending    - Hgb A1c pending  - CT AP: 3.6x4cm air and fluid collection in cul de sac      Plan:   - IV Zosyn/Flagyl > will transition to 7 days Augmentin PO on discharge   - AM CBC   - WBC trend 17 > 14 > 12 > 16   - Pain regimen: Toradol>Ibuprofen/Tylenol Q6H, Oxy 5/10 PRN, Dilaudid 0.5mg BTP; methocarbamol 1g QID and oxybutnin 5mg TID    - Anti emetics PRN   - Cardiac diet  - Plan to consult IR for possible drainage on Monday; Sunday NPO midnight and CBC PT/INR Monday AM   - VTE ppx: Lovenox 40mg BID, patient declining. Counseled on risks of VTE/DVT/PE given multiple risk factors, still declining. Changing to Apixaban 2.5mg BID, patient still declining.   - Strict I&Os    - Marginal UOP since admission, 30cc/hr       S/P RA-TLH/BSO/SLND  - Per operative report: complicated by extensive abdominal adhesions and abscess noted along R posterior colpotomy separate from R ureter and  "sigmoid colon. SurgiFlo placed at colpotomy and in retroperitoneum. EBL 100cc.    - Pathology report: grade 1 stage 1A endometrioid cancer     History of myocardial infarction due to demand ischemia  - Last cardiology note pre op clearance 4/13/2023, non obstructive CAD 30% EKG NSR with no ST changes, ECHO EF normal with grade 1 diastolic dysfunction   - Patient had been recommended ASA, Lipitor 40mg, Lopressor 12.5mg QID, Lisinopril 5mg however patient discontinued "made her feel bad"   - Overnight bradycardic, asymptomatic. EKG pending.     Type 2 diabetes mellitus with other specified complication  - Patient denies history of this, denies medications   - Hgb A1c 6.4, pre diabetic range   - POCT BG AC and QHS    - BG 110s-160s   - SSI       Tobacco abuse  - Nicotine patch PRN     UTI (urinary tract infection)  - UA 3+ leuks   - Urine culture NGTD        Benign essential hypertension  - Patient denies history of this, denies home medications  - Hydral 10mg IV PRN SBP > 180         VTE Risk Mitigation (From admission, onward)         Ordered     apixaban tablet 2.5 mg  2 times daily         05/07/23 0601     IP VTE HIGH RISK PATIENT  Once         05/05/23 1845     Place sequential compression device  Until discontinued         05/05/23 1845                Was spencer catheter removed? Yes     Jennifer Burt MD  Gynecologic Oncology  [facility] ([address])  "

## 2023-05-08 NOTE — SUBJECTIVE & OBJECTIVE
Interval History:  NAEON. Patient reports her pain feels the same. She did not get Tylenol or Ibuprofen overnight, no PRN pain medications. Tolerating PO without vomiting. Endorses nausea. Passing gas, had BM yesterday. Ambulating without difficulty. Declining Apixaban. Denies urinary burning however reports continued pelvic pressure.     Scheduled Meds:   acetaminophen  650 mg Oral Q6H    apixaban  2.5 mg Oral BID    ibuprofen  600 mg Oral Q6H    methocarbamoL  1,000 mg Oral QID    metronidazole  500 mg Intravenous Q8H    mupirocin   Nasal BID    nicotine  1 patch Transdermal Daily    oxybutynin  5 mg Oral TID    piperacillin-tazobactam (ZOSYN) IVPB  4.5 g Intravenous Q8H     Continuous Infusions:   lactated ringers 40 mL/hr at 05/08/23 0134     PRN Meds:dextrose 10%, dextrose 10%, dextrose, dextrose, glucagon (human recombinant), hydrALAZINE, HYDROmorphone, insulin aspart U-100, ondansetron, oxyCODONE, oxyCODONE, prochlorperazine, sodium chloride 0.9%    Review of patient's allergies indicates:  No Known Allergies    Objective:     Vital Signs (Most Recent):  Temp: 98 °F (36.7 °C) (05/08/23 0723)  Pulse: (!) 50 (05/08/23 0723)  Resp: 18 (05/08/23 0723)  BP: 129/62 (05/08/23 0723)  SpO2: 100 % (05/08/23 0723)   Vital Signs (24h Range):  Temp:  [96.3 °F (35.7 °C)-98.9 °F (37.2 °C)] 98 °F (36.7 °C)  Pulse:  [46-66] 50  Resp:  [12-20] 18  SpO2:  [98 %-100 %] 100 %  BP: ()/(53-84) 129/62     Weight: 109.8 kg (242 lb 1 oz)  Body mass index is 44.27 kg/m².    Intake/Output - Last 3 Shifts         05/06 0700  05/07 0659 05/07 0700  05/08 0659 05/08 0700  05/09 0659    P.O. 720 1440     IV Piggyback  897.3     Total Intake(mL/kg) 720 (6.6) 2337.3 (21.3)     Urine (mL/kg/hr) 650 (0.2) 400 (0.2)     Total Output 650 400     Net +70 +1937.3            Urine Occurrence 2 x 3 x                   Physical Exam:   Constitutional: She is oriented to person, place, and time. She appears well-developed and well-nourished. No  distress.    HENT:   Head: Normocephalic and atraumatic.    Eyes: EOM are normal.     Cardiovascular:  Normal rate and intact distal pulses.      Exam reveals no edema.        Pulmonary/Chest: Effort normal and breath sounds normal. No respiratory distress.        Abdominal: Soft. She exhibits abdominal incision. She exhibits no distension. There is abdominal tenderness.   Tender to palpation diffusely, non tympanic, soft however limited by habitus, no peritoneal signs   Robotic port incisions c/d/I x5             Musculoskeletal: Normal range of motion and moves all extremeties. No tenderness or edema.       Neurological: She is alert and oriented to person, place, and time.    Skin: Skin is warm and dry. No erythema. No pallor.    Psychiatric: She has a normal mood and affect.        Lines/Drains/Airways       Peripheral Intravenous Line  Duration                  Peripheral IV - Single Lumen 05/06/23 0313 22 G Posterior;Right Hand 2 days         Peripheral IV - Single Lumen 05/08/23 0128 22 G Anterior;Left Forearm <1 day                    Laboratory:  Recent Labs   Lab 05/05/23 1908 05/06/23  0520 05/08/23  0627   WBC 14.60* 12.60 16.22*   HGB 11.9* 11.6* 10.7*   HCT 36.5* 36.1* 33.5*   MCV 91 91 92   * 462* 461*        Recent Labs   Lab 05/05/23 1908 05/06/23  0520    139   K 3.5 3.8    109   CO2 21* 22*   BUN 7 8   CREATININE 0.7 0.8   * 202*   PROT 6.9 6.4   BILITOT 0.2 0.2   ALKPHOS 99 96   ALT 35 31   AST 21 19

## 2023-05-08 NOTE — CONSULTS
Outside imaging reviewed.  There is no real organized/drainable fluid collection present.  Predominately mesenteric stranding/inflammation and small amount of gas.      Defer percutaneous drainage at this time.

## 2023-05-08 NOTE — PLAN OF CARE
05/08/23 0956   Final Note   Assessment Type Final Discharge Note   Anticipated Discharge Disposition Home   Hospital Resources/Appts/Education Provided Appointments scheduled and added to AVS   Post-Acute Status   Discharge Delays None known at this time     Pt's ride is on the way to pick her up. Pt is clear for discharge from a CM perspective.

## 2023-05-08 NOTE — ASSESSMENT & PLAN NOTE
"- Last cardiology note pre op clearance 4/13/2023, non obstructive CAD 30% EKG NSR with no ST changes, ECHO EF normal with grade 1 diastolic dysfunction   - Patient had been recommended ASA, Lipitor 40mg, Lopressor 12.5mg QID, Lisinopril 5mg however patient discontinued "made her feel bad"   - Overnight bradycardic, asymptomatic. EKG pending.   "

## 2023-05-08 NOTE — PLAN OF CARE
Problem: Adult Inpatient Plan of Care  Goal: Plan of Care Review  Outcome: Met  Goal: Patient-Specific Goal (Individualized)  Outcome: Met  Goal: Absence of Hospital-Acquired Illness or Injury  Outcome: Met  Goal: Optimal Comfort and Wellbeing  Outcome: Met  Goal: Readiness for Transition of Care  Outcome: Met     Problem: Diabetes Comorbidity  Goal: Blood Glucose Level Within Targeted Range  Outcome: Met     Problem: Bariatric Environmental Safety  Goal: Safety Maintained with Care  Outcome: Met     Problem: Fall Injury Risk  Goal: Absence of Fall and Fall-Related Injury  Outcome: Met

## 2023-05-08 NOTE — ASSESSMENT & PLAN NOTE
- Patient denies history of this, denies medications   - Hgb A1c 6.4, pre diabetic range   - POCT BG AC and QHS    - BG 110s-160s   - SSI

## 2023-05-08 NOTE — PROGRESS NOTES
VSS. R hand & L DUTCH [facility] dc'd. Carlos Alberto diet. Voiding well. Carlos Alberto Oxy for pain. Stab sites X5  to abd with dermabond intact. D/C instructions given to pt. Pt verbalized understanding. Pt released ambulatory to friends in stable condition. Pleasant.

## 2023-05-08 NOTE — DISCHARGE SUMMARY
[facility] ([address])  Gynecologic Oncology  Discharge Summary    Patient Name: Silvana Coronado  MRN: [id number]  Admission Date: 5/5/2023  Hospital Length of Stay: 3 days  Discharge Date and Time:  05/08/2023 9:49 AM  Attending Physician: Rivera Seay MD   Discharging Provider: Nancy Lockhart MD  Primary Care Provider: Jacky Serrato PA-C    HPI:   Silvana Coronado is a 42 y.o. grade 1 stage 1a endometrioid cancer POD#9  RA-TLH/BSO/SNLD/JASVIR with Dr. Hathaway. PMHx h/o STEMI x3, obesity, HTN, tobacco usage, DM2.     Patient underwent surgical staging with Dr Hathaway on 4/26/23. Surgery complicated by extensive abdominal adhesions and presence of abscess traversing length of colpotomy along posterior edge. Surgifo was applied to colpotomy with adequate hemostasis. Pathology confirmed grade 1 stage IA endometrioid cancer.     Patient initially presented to OSH for subjective fevers, nausea, vomiting, diarrhea, pelvic cramps x5 days and vaginal discharge x2 days. At OSH afebrile VSS. WBC 17, lactate 2.5. Given 1L LR and IV Vanc. CT AP with air fluid collection with mild rim enhancement in cul de sac 3.6x4cm. Transferred to [facility] for higher level of care.     On admission patient reports she has had subjective fevers, nausea vomiting and diarrhea for the last 5 days. Initially she suspected a food borne illness. Then 2 days ago she notice a purulent foul smelling vaginal discharge requiring her to wear a pad. Denies any vaginal bleeding. Maintains pelvic rest. Denies any dysuria however reports pelvic pressure with urinating. Reports her post operative pain had been well controlled on scheduled ibuprofen/Tylenol however her pelvic cramps started with the diarrhea. Denies any hematochezia/melena. Denies any CP, SOB.       Hospital Course:  05/05/2023 HD#1/POD#9: Admitted for post op intra abdominal abscess seen on CT AP at OSH (3.6x4cm). Blood, urine and vaginal cx obtained. IV  Vanc/Zosyn initaited. WBC 15.   5/6/2023 HD#2/POD#10: Pain unchanged, WBC 12. D/c Vanc, added Flagyl. IR not available on weekend for non emergent drainage. SSE repeated, vaginal cuff intact. Declining Lovenox.   05/07/2023 HD#3/POD#11: Pain unchanged. Cont Zosyn/Flagyl. Plan for NPO midnight will consult IR on Monday for drainage. Adding oxybutnin 5mg TID.   05/08/2023 HD#4/POD#12: Pain unchanged however patient did not receive PO pain medication overnight. Bowel function present. UOP 400cc. AM CBC, PT/INR pending. IR consulted and recommend no drainage given small size. Labs remain stable and clinically patient remains stable. Plan to dc home with augmentin and close f/u on 5/11 with primary gyn onc.       Goals of Care Treatment Preferences:  Code Status: Full Code      * No surgery found *     Consults (From admission, onward)        Status Ordering Provider     Inpatient consult to Social Work  Once        Provider:  (Not yet assigned)    Completed JANESSA MARTIN     Inpatient consult to Interventional Radiology  Once        Provider:  (Not yet assigned)    Acknowledged JANESSA MARTIN          Significant Diagnostic Studies: Labs:   CMP No results for input(s): NA, K, CL, CO2, GLU, BUN, CREATININE, CALCIUM, PROT, ALBUMIN, BILITOT, ALKPHOS, AST, ALT, ANIONGAP, ESTGFRAFRICA, EGFRNONAA in the last 48 hours. and CBC   Recent Labs   Lab 05/08/23  0627   WBC 16.22*   HGB 10.7*   HCT 33.5*   *       Pending Diagnostic Studies:     Procedure Component Value Units Date/Time    EKG 12-lead [[id number]]     Order Status: Sent Lab Status: No result         Final Active Diagnoses:    Diagnosis Date Noted POA    PRINCIPAL PROBLEM:  Postoperative intra-abdominal abscess [T81.43XA] 05/05/2023 Yes    UTI (urinary tract infection) [N39.0] 05/07/2023 Yes    Benign essential hypertension [I10] 05/05/2023 Yes    S/P RA-TLH/BSO/SLND [Z90.710] 04/26/2023 Not Applicable    History of myocardial infarction due to demand  ischemia [I25.2] 04/25/2023 Not Applicable    Type 2 diabetes mellitus with other specified complication [E11.69] 04/25/2023 Yes    Tobacco abuse [Z72.0] 04/25/2023 Yes    Class 3 severe obesity with body mass index (BMI) of 40.0 to 44.9 in adult [E66.01, Z68.41] 03/23/2023 Not Applicable    Endometrial cancer [C54.1] 03/20/2023 Yes      Problems Resolved During this Admission:        Does this patient meet criteria for extended DVT prophylaxis? no    Discharged Condition: good    Disposition: Home or Self Care    Follow Up:    Patient Instructions:      Ambulatory referral/consult to Smoking Cessation Program   Standing Status: Future   Referral Priority: Routine Referral Type: Consultation   Referral Reason: Specialty Services Required   Requested Specialty: CTTS   Number of Visits Requested: 1     Diet Adult Regular     No driving until:   Order Comments: Able to safely slam on the breaks without pain and not taking narcotic pain medications     Pelvic Rest   Order Comments: Pelvic rest (nothing in the vagina) for 4-6 weeks.     Notify your health care provider if you experience any of the following:  temperature >100.4     Notify your health care provider if you experience any of the following:  persistent nausea and vomiting or diarrhea     Notify your health care provider if you experience any of the following:  severe uncontrolled pain     Notify your health care provider if you experience any of the following:  redness, tenderness, or signs of infection (pain, swelling, redness, odor or green/yellow discharge around incision site)     Notify your health care provider if you experience any of the following:  difficulty breathing or increased cough     Notify your health care provider if you experience any of the following:  severe persistent headache     Notify your health care provider if you experience any of the following:  worsening rash     Notify your health care provider if you experience any of  the following:  persistent dizziness, light-headedness, or visual disturbances     Notify your health care provider if you experience any of the following:  increased confusion or weakness     Notify your health care provider if you experience any of the following:   Order Comments: Heavy vaginal bleeding saturating more than 1 pad per hour for at least 2 hours.     Activity as tolerated     Medications:  Reconciled Home Medications:      Medication List      START taking these medications    amoxicillin-clavulanate 875-125mg 875-125 mg per tablet  Commonly known as: AUGMENTIN  Take 1 tablet by mouth every 12 (twelve) hours. for 10 days     methocarbamoL 500 MG Tab  Commonly known as: ROBAXIN  Take 2 tablets (1,000 mg total) by mouth 4 (four) times daily. for 10 days     nicotine 14 mg/24 hr  Commonly known as: NICODERM CQ  Place 1 patch onto the skin once daily.  Start taking on: May 9, 2023     polyethylene glycol 17 gram/dose powder  Commonly known as: GLYCOLAX  Take 17 g by mouth once daily.     senna 8.6 mg tablet  Commonly known as: SENNA  Take 1 tablet by mouth 2 (two) times daily.        CONTINUE taking these medications    acetaminophen 500 MG tablet  Commonly known as: TYLENOL  Take 1 tablet (500 mg total) by mouth every 6 (six) hours as needed for Pain. Take alternating with ibuprofen hours scheduled for the 1st week after surgery     ibuprofen 600 MG tablet  Commonly known as: ADVIL,MOTRIN  Take 1 tablet (600 mg total) by mouth every 6 (six) hours as needed for Pain. Take scheduled alternating with tylenol every 6 hours for the first week after surgery     oxyCODONE-acetaminophen 5-325 mg per tablet  Commonly known as: PERCOCET  Take 1 tablet by mouth every 4 (four) hours as needed for Pain.        STOP taking these medications    docusate sodium 100 MG capsule  Commonly known as: COLKEREN Lockhart MD  Gynecologic Oncology  [facility] ([address])

## 2023-05-10 LAB — BACTERIA SPEC ANAEROBE CULT: NORMAL

## 2023-05-11 LAB
BACTERIA BLD CULT: NORMAL
BACTERIA BLD CULT: NORMAL

## 2023-05-17 LAB
COMMENT: NORMAL
DNA RANGE(S) EXAMINED NAR: NORMAL
FINAL PATHOLOGIC DIAGNOSIS: NORMAL
GENE DIS ANL INTERP-IMP: POSITIVE
GENE DIS ASSESSED: NORMAL
GENE MUT TESTED BLD/T: 4.2 M/MB
GROSS: NORMAL
Lab: NORMAL
MICROSCOPIC EXAM: NORMAL
MSI CA SPEC-IMP: NORMAL
REASON FOR STUDY: NORMAL
SUPPLEMENTAL DIAGNOSIS: NORMAL
TEMPUS FUSIONADDENDUM: NORMAL
TEMPUS LCA: NORMAL
TEMPUS PORTAL: NORMAL
TEMPUS THERAPY1: NORMAL
TEMPUS THERAPY2: NORMAL
TEMPUS THERAPY3: NORMAL
TEMPUS THERAPYCOUNT: 3
TEMPUS TRIAL1: NORMAL
TEMPUS TRIAL2: NORMAL
TEMPUS TRIAL3: NORMAL
TEMPUS TRIALCOUNT: 3

## 2023-05-18 ENCOUNTER — TELEPHONE (OUTPATIENT)
Dept: GYNECOLOGIC ONCOLOGY | Facility: CLINIC | Age: 43
End: 2023-05-18
Payer: MEDICAID

## 2023-05-18 NOTE — TELEPHONE ENCOUNTER
Spoke with our patient about her reschedule appointment she voiced understanding of the date, time and location. All questions answered  Provider Scheduling Coord.  Gynecologic Oncology MA/PAR /Preceptor Salomon Stewart

## 2023-05-18 NOTE — TELEPHONE ENCOUNTER
----- Message from Prasad Hathaway MD sent at 5/17/2023 12:18 PM CDT -----  Looks like Silvana cancelled her post op appointment with me for 5/11.  Could we reschedule this?    Thanks,  Prasad

## 2023-05-22 ENCOUNTER — OFFICE VISIT (OUTPATIENT)
Dept: GYNECOLOGIC ONCOLOGY | Facility: CLINIC | Age: 43
End: 2023-05-22
Payer: MEDICAID

## 2023-05-22 VITALS
DIASTOLIC BLOOD PRESSURE: 60 MMHG | HEART RATE: 87 BPM | HEIGHT: 63 IN | WEIGHT: 238 LBS | BODY MASS INDEX: 42.17 KG/M2 | SYSTOLIC BLOOD PRESSURE: 117 MMHG

## 2023-05-22 DIAGNOSIS — C54.1 ENDOMETRIAL CANCER: ICD-10-CM

## 2023-05-22 PROCEDURE — 3078F PR MOST RECENT DIASTOLIC BLOOD PRESSURE < 80 MM HG: ICD-10-PCS | Mod: CPTII,,, | Performed by: STUDENT IN AN ORGANIZED HEALTH CARE EDUCATION/TRAINING PROGRAM

## 2023-05-22 PROCEDURE — 99999 PR PBB SHADOW E&M-EST. PATIENT-LVL III: CPT | Mod: PBBFAC,,, | Performed by: STUDENT IN AN ORGANIZED HEALTH CARE EDUCATION/TRAINING PROGRAM

## 2023-05-22 PROCEDURE — 1111F PR DISCHARGE MEDS RECONCILED W/ CURRENT OUTPATIENT MED LIST: ICD-10-PCS | Mod: CPTII,,, | Performed by: STUDENT IN AN ORGANIZED HEALTH CARE EDUCATION/TRAINING PROGRAM

## 2023-05-22 PROCEDURE — 99213 OFFICE O/P EST LOW 20 MIN: CPT | Mod: PBBFAC | Performed by: STUDENT IN AN ORGANIZED HEALTH CARE EDUCATION/TRAINING PROGRAM

## 2023-05-22 PROCEDURE — 1159F PR MEDICATION LIST DOCUMENTED IN MEDICAL RECORD: ICD-10-PCS | Mod: CPTII,,, | Performed by: STUDENT IN AN ORGANIZED HEALTH CARE EDUCATION/TRAINING PROGRAM

## 2023-05-22 PROCEDURE — 1159F MED LIST DOCD IN RCRD: CPT | Mod: CPTII,,, | Performed by: STUDENT IN AN ORGANIZED HEALTH CARE EDUCATION/TRAINING PROGRAM

## 2023-05-22 PROCEDURE — 3044F HG A1C LEVEL LT 7.0%: CPT | Mod: CPTII,,, | Performed by: STUDENT IN AN ORGANIZED HEALTH CARE EDUCATION/TRAINING PROGRAM

## 2023-05-22 PROCEDURE — 3008F PR BODY MASS INDEX (BMI) DOCUMENTED: ICD-10-PCS | Mod: CPTII,,, | Performed by: STUDENT IN AN ORGANIZED HEALTH CARE EDUCATION/TRAINING PROGRAM

## 2023-05-22 PROCEDURE — 3074F PR MOST RECENT SYSTOLIC BLOOD PRESSURE < 130 MM HG: ICD-10-PCS | Mod: CPTII,,, | Performed by: STUDENT IN AN ORGANIZED HEALTH CARE EDUCATION/TRAINING PROGRAM

## 2023-05-22 PROCEDURE — 3044F PR MOST RECENT HEMOGLOBIN A1C LEVEL <7.0%: ICD-10-PCS | Mod: CPTII,,, | Performed by: STUDENT IN AN ORGANIZED HEALTH CARE EDUCATION/TRAINING PROGRAM

## 2023-05-22 PROCEDURE — 99999 PR PBB SHADOW E&M-EST. PATIENT-LVL III: ICD-10-PCS | Mod: PBBFAC,,, | Performed by: STUDENT IN AN ORGANIZED HEALTH CARE EDUCATION/TRAINING PROGRAM

## 2023-05-22 PROCEDURE — 3074F SYST BP LT 130 MM HG: CPT | Mod: CPTII,,, | Performed by: STUDENT IN AN ORGANIZED HEALTH CARE EDUCATION/TRAINING PROGRAM

## 2023-05-22 PROCEDURE — 99214 OFFICE O/P EST MOD 30 MIN: CPT | Mod: 24,S$PBB,, | Performed by: STUDENT IN AN ORGANIZED HEALTH CARE EDUCATION/TRAINING PROGRAM

## 2023-05-22 PROCEDURE — 1111F DSCHRG MED/CURRENT MED MERGE: CPT | Mod: CPTII,,, | Performed by: STUDENT IN AN ORGANIZED HEALTH CARE EDUCATION/TRAINING PROGRAM

## 2023-05-22 PROCEDURE — 99214 PR OFFICE/OUTPT VISIT, EST, LEVL IV, 30-39 MIN: ICD-10-PCS | Mod: 24,S$PBB,, | Performed by: STUDENT IN AN ORGANIZED HEALTH CARE EDUCATION/TRAINING PROGRAM

## 2023-05-22 PROCEDURE — 3008F BODY MASS INDEX DOCD: CPT | Mod: CPTII,,, | Performed by: STUDENT IN AN ORGANIZED HEALTH CARE EDUCATION/TRAINING PROGRAM

## 2023-05-22 PROCEDURE — 3078F DIAST BP <80 MM HG: CPT | Mod: CPTII,,, | Performed by: STUDENT IN AN ORGANIZED HEALTH CARE EDUCATION/TRAINING PROGRAM

## 2023-05-22 RX ORDER — ALBUTEROL SULFATE 90 UG/1
AEROSOL, METERED RESPIRATORY (INHALATION)
COMMUNITY

## 2023-05-22 RX ORDER — ALBUTEROL SULFATE 0.83 MG/ML
SOLUTION RESPIRATORY (INHALATION)
COMMUNITY

## 2023-05-22 NOTE — PROGRESS NOTES
Referring Provider:  No referring provider defined for this encounter.   Subjective:      Patient ID: Silvana Coronado is a 42 y.o. female.    Chief Complaint: Post-op Evaluation (4 week post op )    Problem List Items Addressed This Visit          Oncology    Endometrial cancer    Overview     23: EMB with fragmented endometrioid proliferation, probably representing endometrioid carcinoma (FIGO G1) and complex atypical hyperplasia. US with 12.7 mm EMS  23: RA-TLH BSO SLN. Path: IA G1 non-invasive endometrioid adenocarcinoma, No LVSI, MMR intact, 0/2 SLN.  Plan for surveillance               HPI Recovering well from surgery. Some slight spotting still. Seen at [facility] ED for drainage and pelvic discomfort and small pelvic fluid collection. Did not require drainage.     Review of Systems   Constitutional:  Negative for chills, fatigue and fever.   Respiratory:  Negative for cough and shortness of breath.    Cardiovascular:  Negative for chest pain.   Gastrointestinal:  Negative for abdominal distention, abdominal pain, constipation and diarrhea.   Genitourinary:  Positive for vaginal bleeding. Negative for dysuria and pelvic pain.   Musculoskeletal:  Negative for back pain.   Psychiatric/Behavioral:  Negative for dysphoric mood. The patient is not nervous/anxious.    Past Medical History:   Diagnosis Date    Asthma     Back pain     Heart attack 2020    x3      Past Surgical History:   Procedure Laterality Date     SECTION      GALLBLADDER SURGERY      LYMPH NODE BIOPSY N/A 2023    Procedure: BIOPSY, LYMPH NODE;  Surgeon: Prasad Hathaway MD;  Location: [facility];  Service: OB/GYN;  Laterality: N/A;    MAPPING, LYMPH NODE, SENTINEL N/A 2023    Procedure: MAPPING, LYMPH NODE, SENTINEL;  Surgeon: Prasad Hathaway MD;  Location: [facility];  Service: OB/GYN;  Laterality: N/A;    ROBOT-ASSISTED LAPAROSCOPIC ABDOMINAL HYSTERECTOMY USING DA ALLEGRA XI N/A 2023    Procedure: XI ROBOTIC  HYSTERECTOMY;  Surgeon: Prasad Hathaway MD;  Location: [facility] OR;  Service: OB/GYN;  Laterality: N/A;  3.5 HOURS    ROBOT-ASSISTED LAPAROSCOPIC LYSIS OF ADHESIONS USING DA ALLEGRA XI N/A 4/26/2023    Procedure: XI ROBOTIC LYSIS, ADHESIONS;  Surgeon: Prasad Hathaway MD;  Location: [facility] OR;  Service: OB/GYN;  Laterality: N/A;    ROBOT-ASSISTED LAPAROSCOPIC SALPINGO-OOPHORECTOMY USING DA ALLEGRA XI Bilateral 4/26/2023    Procedure: XI ROBOTIC SALPINGO-OOPHORECTOMY;  Surgeon: Prasad Hathaway MD;  Location: [facility] OR;  Service: OB/GYN;  Laterality: Bilateral;    TUBAL LIGATION        History reviewed. No pertinent family history.   Social History     Socioeconomic History    Marital status: Single        Objective:      Vitals:    05/22/23 0949   BP: 117/60   Pulse: 87      Physical Exam  Constitutional:       General: She is not in acute distress.  HENT:      Head: Normocephalic.   Eyes:      Extraocular Movements: Extraocular movements intact.      Conjunctiva/sclera: Conjunctivae normal.   Cardiovascular:      Rate and Rhythm: Normal rate.      Pulses: Normal pulses.   Pulmonary:      Effort: Pulmonary effort is normal. No respiratory distress.      Breath sounds: No wheezing.   Abdominal:      General: There is no distension.      Tenderness: There is no abdominal tenderness. There is no guarding or rebound.      Comments: Pfannenstiel scar and laparoscopic scars from cholecystectomy. Robotic incisions healing well   Genitourinary:     Comments: External genitalia normal. Vagina normal. Uterus, cervix, and adnexa surgically absent. Vaginal cuff intact.     Musculoskeletal:         General: No deformity.   Neurological:      Mental Status: She is alert and oriented to person, place, and time.   Psychiatric:         Mood and Affect: Mood normal.         Behavior: Behavior normal.         Thought Content: Thought content normal.       Lab Results   Component Value Date    WBC 16.22 (H) 05/08/2023    HGB 10.7 (L)  05/08/2023    HCT 33.5 (L) 05/08/2023    MCV 92 05/08/2023     (H) 05/08/2023     Final Pathologic Diagnosis     1.  Left pelvic sentinel lymph node, excision:   1 lymph node with no evidence of metastatic carcinoma (0/1)   Multiple H&E levels were examined.  Immunohistochemical stains for AE1/AE3, WSK, PAX8, and Cam 5.2 were performed with adequate controls     2.  Right pelvic sentinel lymph node, excision:   2 lymph nodes with no evidence of metastatic carcinoma (0/2)   Multiple H&E levels were examined.     1 lymph node shows a single gland of benign ciliated cells with interspersed peg cells, consistent with endosalpingiosis   Immunohistochemical stains for AE1/AE3, WSK, and Cam 5.2 were performed with adequate controls     3. Uterus, cervix, bilateral tubes and ovaries; hysterectomy and bilateral salpingo-oophorectomy:     Uterus:  FIGO Grade 1 endometrioid endometrial carcinoma with tubal differentiation, focal squamous differentiation and focal mucinous differentiation. Tumor involves the posterior endocervical epithelium, without cervical stromal involvement.     Superficial (non-myoinvasive) involvement of tumor is present in both the anterior and posterior lower uterine segment.  Negative for myometrial invasion.  Calcified vasculature.   Bilateral fallopian tubes:  Fimbriated fallopian tube segments.  Negative for atypia or malignancy.     Bilateral ovaries:  Ovarian parenchyma with hemorrhagic corpora lutea, corpora lutea and corpora albicantia.  Negative for atypia or malignancy.   See surgical pathology cancer case summary below   Best tumor block: 3E             Assessment:       Endometrial cancer           Plan:       Endometrial carcinoma: Stage IA non-invasive G1 Endometrioid endometrial cancer (No LVSI, MMR intact, Tempus NGS with Kras, ARIDIA, PIK3CA, PTEN mutations). S/p RA-TLH BSO SLN on 4/26/23. We reviewed the eitiology and prognosis for early stage endometrial cancer. We reviewed  her pathology report from surgery in detail. We reviewed the rationale for adjuvant therapy and toxicities of different modalities including radiation, chemotherapy, and immune therapy. Based on her pathologic factors, she is a low risk for endometrial cancer recurrence and I do not recommend adjuvant treatment. We reviewed that though I estimate her risk for cancer recurrence is <5%, I recommend continuing in a surveillance program. Plan for exams and visits every 6 months for the first year then yearly until 5 years out from surgery.  RONC in 6 months. Can Alternate with primary GYN for subsequent visits.    Morbid obesity: BMI 44. Reports past success with 150lb weight loss from max weight of 400 lbs. Reviewed the potentially causal relationship between obesity and CAH/endometrial cancer. Encouraged continued weight loss efforts.      Though this visit took place within the global post op period, counseling today covered issues not related to the surgery, specifically the prognosis of her pathology and the pros and cons of adjuvant treatment including chemotherapy, radiation, or additional surgery.     .      Prasad Hathaway MD

## 2023-05-22 NOTE — Clinical Note
Elysia,  Thanks again for sending Silvana to see me. Thankfully we were able to catch her endometrial cancer at an early stage and she does not need adjuvant treatment. I'm planning to see her back in 6 months for a surveillance visit. After that, I'm fine to alternate visits.  Please feel free to reach out anytime with questions or referrals, and I will always work to make sure I get patients seen as quickly as possible.  Thanks, Prasad Hathaway Cell: [phone]

## 2023-08-07 PROBLEM — N39.0 UTI (URINARY TRACT INFECTION): Status: RESOLVED | Noted: 2023-05-07 | Resolved: 2023-08-07

## 2023-08-23 NOTE — DISCHARGE INSTRUCTIONS
Information to Prepare you for your Surgery    PRE-ADMIT TESTING -  [phone]    [address] [facility]          Your surgery has been scheduled at [facility]. We are pleased to have the opportunity to serve you. For Further Information please call [phone].    On the day of surgery please report to the Information Desk on the 1st floor.    CONTACT YOUR PHYSICIAN'S OFFICE THE DAY PRIOR TO YOUR SURGERY TO OBTAIN YOUR ARRIVAL TIME.     The evening before surgery do not eat anything after 9 p.m. ( this includes hard candy, chewing gum and mints).  You may only have GATORADE, POWERADE AND WATER  from 9 p.m. until you leave your home.   DO NOT DRINK ANY LIQUIDS ON THE WAY TO THE HOSPITAL.      Why does your anesthesiologist allow you to drink Gatorade/Powerade before surgery?  Gatorade/Powerade helps to increase your comfort before surgery and to decrease your nausea after surgery. The carbohydrates in Gatorade/Powerade help reduce your body's stress response to surgery.  If you are a diabetic-drink only water prior to surgery.       Patients may have 2 visitors pre and post procedure. Only 2 visitors will be allowed in the Surgical building with the patient. No one under the age of 12 will be allowed into the facility.    SPECIAL MEDICATION INSTRUCTIONS: TAKE medications checked off by the Anesthesiologist on your Medication List.    Angiogram Patients: Take medications as instructed by your physician, including aspirin.     Surgery Patients:    If you take ASPIRIN - Your PHYSICIAN/SURGEON will need to inform you IF/OR when you need to stop taking aspirin prior to your surgery.     The week prior to surgery do not ot take any medications containing IBUPROFEN or NSAIDS ( Advil, Motrin, Goodys, BC, Aleve, Naproxen etc) If you are not sure if you should take a medicine please call your surgeon's office.  Ok to take Tylenol    Do Not Wear any make-up  [FreeTextEntry1] : 21M here for hypogonadism  hypogonadism - on clomid 50 MG QOD - TT, E2, LH, FSH, CBC, CMP - SA - review by phone.  (especially eye make-up) to surgery. Please remove any false eyelashes or eyelash extensions. If you arrive the day of surgery with makeup/eyelashes on you will be required to remove prior to surgery. (There is a risk of corneal abrasions if eye makeup/eyelash extensions are not removed)      Leave all valuables at home.   Do Not wear any jewelry or watches, including any metal in body piercings. Jewelry must be removed prior to coming to the hospital.  There is a possibility that rings that are unable to be removed may be cut off if they are on the surgical extremity.    Please remove all hair extensions, wigs, clips and any other metal accessories/ ornaments from your hair.  These items may pose a flammable/fire risk in Surgery and must be removed.    Do not shave your surgical area at least 5 days prior to your surgery. The surgical prep will be performed at the hospital according to Infection Control regulations.    Contact Lens must be removed before surgery. Either do not wear the contact lens or bring a case and solution for storage.  Please bring a container for eyeglasses or dentures as required.  Bring any paperwork your physician has provided, such as consent forms,  history and physicals, doctor's orders, etc.   Bring comfortable clothes that are loose fitting to wear upon discharge. Take into consideration the type of surgery being performed.  Maintain your diet as advised per your physician the day prior to surgery.      Adequate rest the night before surgery is advised.   Park in the Parking lot behind the hospital or in the [facility] across the street from the parking lot. Parking is complimentary.  If you will be discharged the same day as your procedure, please arrange for a responsible adult to drive you home or to accompany you if traveling by taxi.   YOU WILL NOT BE PERMITTED TO DRIVE OR TO LEAVE THE HOSPITAL ALONE AFTER SURGERY.   If you are being discharged the same day, it is  strongly recommended that you arrange for someone to remain with you for the first 24 hrs following your surgery.    The Surgeon will speak to your family/visitor after your surgery regarding the outcome of your surgery and post op care.  The Surgeon may speak to you after your surgery, but there is a possibility you may not remember the details.  Please check with your family members regarding the conversation with the Surgeon.    We strongly recommend whoever is bringing you home be present for discharge instructions.  This will ensure a thorough understanding for your post op home care.    ALL CHILDREN MUST ALWAYS BE ACCOMPANIED BY AN ADULT.    Visitors-Refer to current Visitor policy handouts.    Thank you for your cooperation.  The Staff of [facility].            Bathing Instructions with Hibiclens    Shower the evening before and morning of your procedure with Chlorhexidine (Hibiclens)  do not use Chlorhexidine on your face or genitals. Do not get in your eyes.  Wash your face with water and your regular face wash/soap  Use your regular shampoo  Apply Chlorhexidine (Hibiclens) directly on your skin or on a wet washcloth and wash gently. When showering: Move away from the shower stream when applying Chlorhexidine (Hibiclens) to avoid rinsing off too soon.  Rinse thoroughly with warm water  Do not dilute Chlorhexidine (Hibiclens)   Dry off as usual, do not use any deodorant, powder, body lotions, perfume, after shave or cologne.

## 2023-11-07 ENCOUNTER — TELEPHONE (OUTPATIENT)
Dept: GYNECOLOGIC ONCOLOGY | Facility: CLINIC | Age: 43
End: 2023-11-07
Payer: MEDICAID

## 2024-04-04 ENCOUNTER — TELEPHONE (OUTPATIENT)
Dept: GYNECOLOGIC ONCOLOGY | Facility: CLINIC | Age: 44
End: 2024-04-04
Payer: MEDICAID

## 2024-04-04 NOTE — TELEPHONE ENCOUNTER
--Spoke with our patient about her schedule appointment ---- Message from Jaz Lassiter RN sent at 4/4/2024  1:17 PM CDT -----    ----- Message -----  From: Luke Holguin  Sent: 4/4/2024  12:21 PM CDT  To: Prasad Hathaway Staff    Name of Who is Calling: PAPO DALEY [[id number]]      What is the request in detail: Medicaid pt called to be scheduled for an appt however Epic didn't allow the access to do so.Please contact to further discuss and advise.        Can the clinic reply by [facility]: Y      What Number to Call Back if not in [facility]: [phone]

## 2024-04-04 NOTE — TELEPHONE ENCOUNTER
Spoke with our patient about her schedule appointment ----- Message from Jaz Lassiter RN sent at 4/4/2024  1:37 PM CDT -----  Regarding: FW: call back    ----- Message -----  From: Dilcia Guevara  Sent: 4/4/2024  12:50 PM CDT  To: Prasad Hathaway Staff  Subject: call back                                        Type: Patient Call Back    Who called: pt     What is the request in detail: requesting call to schedule a return oncology appt; called earlier but needed to update phone number     Can the clinic reply by [facility]?yes    Would the patient rather a call back or a response via [facility]? message    Best call back number: [phone]    Additional Information:

## 2024-04-08 ENCOUNTER — OFFICE VISIT (OUTPATIENT)
Dept: GYNECOLOGIC ONCOLOGY | Facility: CLINIC | Age: 44
End: 2024-04-08
Payer: MEDICAID

## 2024-04-08 VITALS
WEIGHT: 211 LBS | HEIGHT: 62 IN | SYSTOLIC BLOOD PRESSURE: 174 MMHG | DIASTOLIC BLOOD PRESSURE: 94 MMHG | BODY MASS INDEX: 38.83 KG/M2 | HEART RATE: 71 BPM

## 2024-04-08 DIAGNOSIS — E66.09 CLASS 2 OBESITY DUE TO EXCESS CALORIES WITH BODY MASS INDEX (BMI) OF 38.0 TO 38.9 IN ADULT, UNSPECIFIED WHETHER SERIOUS COMORBIDITY PRESENT: ICD-10-CM

## 2024-04-08 DIAGNOSIS — C54.1 ENDOMETRIAL CANCER: Primary | ICD-10-CM

## 2024-04-08 PROCEDURE — 99999 PR PBB SHADOW E&M-EST. PATIENT-LVL III: CPT | Mod: PBBFAC,,, | Performed by: STUDENT IN AN ORGANIZED HEALTH CARE EDUCATION/TRAINING PROGRAM

## 2024-04-08 PROCEDURE — 99213 OFFICE O/P EST LOW 20 MIN: CPT | Mod: PBBFAC | Performed by: STUDENT IN AN ORGANIZED HEALTH CARE EDUCATION/TRAINING PROGRAM

## 2024-04-08 PROCEDURE — 3077F SYST BP >= 140 MM HG: CPT | Mod: CPTII,,, | Performed by: STUDENT IN AN ORGANIZED HEALTH CARE EDUCATION/TRAINING PROGRAM

## 2024-04-08 PROCEDURE — 3008F BODY MASS INDEX DOCD: CPT | Mod: CPTII,,, | Performed by: STUDENT IN AN ORGANIZED HEALTH CARE EDUCATION/TRAINING PROGRAM

## 2024-04-08 PROCEDURE — 99214 OFFICE O/P EST MOD 30 MIN: CPT | Mod: S$PBB,,, | Performed by: STUDENT IN AN ORGANIZED HEALTH CARE EDUCATION/TRAINING PROGRAM

## 2024-04-08 PROCEDURE — G2211 COMPLEX E/M VISIT ADD ON: HCPCS | Mod: S$PBB,,, | Performed by: STUDENT IN AN ORGANIZED HEALTH CARE EDUCATION/TRAINING PROGRAM

## 2024-04-08 PROCEDURE — 3080F DIAST BP >= 90 MM HG: CPT | Mod: CPTII,,, | Performed by: STUDENT IN AN ORGANIZED HEALTH CARE EDUCATION/TRAINING PROGRAM

## 2024-04-08 PROCEDURE — 1159F MED LIST DOCD IN RCRD: CPT | Mod: CPTII,,, | Performed by: STUDENT IN AN ORGANIZED HEALTH CARE EDUCATION/TRAINING PROGRAM

## 2024-04-08 NOTE — PROGRESS NOTES
Referring Provider:  Elysia Garrido MD  Subjective:      Patient ID: Silvana Coronado is a 43 y.o. female.    Chief Complaint: Follow-up (1 year follow up )    Problem List Items Addressed This Visit          Oncology    Endometrial cancer - Primary    Overview     23: EMB with fragmented endometrioid proliferation, probably representing endometrioid carcinoma (FIGO G1) and complex atypical hyperplasia. US with 12.7 mm EMS  23: RA-TLH BSO SLN. Path: IA G1 non-invasive endometrioid adenocarcinoma, No LVSI, MMR intact, 0/2 SLN. [employer] NGS with Kras, ARIDIA, PIK3CA, PTEN mutations  Plan for surveillance            Other Visit Diagnoses       Class 2 obesity due to excess calories with body mass index (BMI) of 38.0 to 38.9 in adult, unspecified whether serious comorbidity present                   Follow-up  Pertinent negatives include no abdominal pain, chest pain, chills, coughing, fatigue or fever.    No new symptoms since last visit. Denies vaginal bleeding, nausea, vomiting, and changes in BM     Review of Systems   Constitutional:  Negative for chills, fatigue and fever.   Respiratory:  Negative for cough and shortness of breath.    Cardiovascular:  Negative for chest pain.   Gastrointestinal:  Negative for abdominal distention, abdominal pain, constipation and diarrhea.   Genitourinary:  Negative for dysuria, pelvic pain and vaginal bleeding.   Musculoskeletal:  Negative for back pain.   Psychiatric/Behavioral:  Negative for dysphoric mood. The patient is not nervous/anxious.      Past Medical History:   Diagnosis Date    Asthma     Back pain     Heart attack 2020    x3      Past Surgical History:   Procedure Laterality Date     SECTION      GALLBLADDER SURGERY      LYMPH NODE BIOPSY N/A 2023    Procedure: BIOPSY, LYMPH NODE;  Surgeon: Prasad Hathaway MD;  Location: [facility];  Service: OB/GYN;  Laterality: N/A;    MAPPING, LYMPH NODE, SENTINEL N/A 2023    Procedure: MAPPING,  LYMPH NODE, SENTINEL;  Surgeon: Prasad Hathaway MD;  Location: [facility] OR;  Service: OB/GYN;  Laterality: N/A;    ROBOT-ASSISTED LAPAROSCOPIC ABDOMINAL HYSTERECTOMY USING DA ALLEGRA XI N/A 4/26/2023    Procedure: XI ROBOTIC HYSTERECTOMY;  Surgeon: Prasad Hathaway MD;  Location: [facility] OR;  Service: OB/GYN;  Laterality: N/A;  3.5 HOURS    ROBOT-ASSISTED LAPAROSCOPIC LYSIS OF ADHESIONS USING DA ALLEGRA XI N/A 4/26/2023    Procedure: XI ROBOTIC LYSIS, ADHESIONS;  Surgeon: Prasad Hathaway MD;  Location: [facility] OR;  Service: OB/GYN;  Laterality: N/A;    ROBOT-ASSISTED LAPAROSCOPIC SALPINGO-OOPHORECTOMY USING DA ALLEGRA XI Bilateral 4/26/2023    Procedure: XI ROBOTIC SALPINGO-OOPHORECTOMY;  Surgeon: Prasad Hathaway MD;  Location: [facility];  Service: OB/GYN;  Laterality: Bilateral;    TUBAL LIGATION        History reviewed. No pertinent family history.   Social History     Socioeconomic History    Marital status: Single        Objective:      Vitals:    04/08/24 0831   BP: (!) 174/94   Pulse: 71      Physical Exam  Constitutional:       General: She is not in acute distress.  HENT:      Head: Normocephalic.   Eyes:      Extraocular Movements: Extraocular movements intact.      Conjunctiva/sclera: Conjunctivae normal.   Cardiovascular:      Rate and Rhythm: Normal rate.      Pulses: Normal pulses.   Pulmonary:      Effort: Pulmonary effort is normal. No respiratory distress.      Breath sounds: No wheezing.   Abdominal:      General: There is no distension.      Tenderness: There is no abdominal tenderness. There is no guarding or rebound.      Comments: Pfannenstiel scar and laparoscopic scars from cholecystectomy. Robotic incisions well healed   Genitourinary:     Comments: External genitalia normal. Vagina normal. Uterus, cervix, and adnexa surgically absent. Vaginal cuff with no lesions. No palpable masses. A female staff member was present for the exam.    Musculoskeletal:         General: No deformity.    Neurological:      Mental Status: She is alert and oriented to person, place, and time.   Psychiatric:         Mood and Affect: Mood normal.         Behavior: Behavior normal.         Thought Content: Thought content normal.         Lab Results   Component Value Date    WBC 16.22 (H) 05/08/2023    HGB 10.7 (L) 05/08/2023    HCT 33.5 (L) 05/08/2023    MCV 92 05/08/2023     (H) 05/08/2023     Final Pathologic Diagnosis     1.  Left pelvic sentinel lymph node, excision:   1 lymph node with no evidence of metastatic carcinoma (0/1)   Multiple H&E levels were examined.  Immunohistochemical stains for AE1/AE3, WSK, PAX8, and Cam 5.2 were performed with adequate controls     2.  Right pelvic sentinel lymph node, excision:   2 lymph nodes with no evidence of metastatic carcinoma (0/2)   Multiple H&E levels were examined.     1 lymph node shows a single gland of benign ciliated cells with interspersed peg cells, consistent with endosalpingiosis   Immunohistochemical stains for AE1/AE3, WSK, and Cam 5.2 were performed with adequate controls     3. Uterus, cervix, bilateral tubes and ovaries; hysterectomy and bilateral salpingo-oophorectomy:     Uterus:  FIGO Grade 1 endometrioid endometrial carcinoma with tubal differentiation, focal squamous differentiation and focal mucinous differentiation. Tumor involves the posterior endocervical epithelium, without cervical stromal involvement.     Superficial (non-myoinvasive) involvement of tumor is present in both the anterior and posterior lower uterine segment.  Negative for myometrial invasion.  Calcified vasculature.   Bilateral fallopian tubes:  Fimbriated fallopian tube segments.  Negative for atypia or malignancy.     Bilateral ovaries:  Ovarian parenchyma with hemorrhagic corpora lutea, corpora lutea and corpora albicantia.  Negative for atypia or malignancy.   See surgical pathology cancer case summary below   Best tumor block: 3E             Assessment:        Endometrial cancer    Class 2 obesity due to excess calories with body mass index (BMI) of 38.0 to 38.9 in adult, unspecified whether serious comorbidity present             Plan:       Stage IA Endometrial carcinoma: Surgery: RA-TLH BSO SLN on 4/26/23 Path: Stage IA non-invasive G1 Endometrioid endometrial cancer (No LVSI, MMR intact, [employer] NGS with Kras, ARIDIA, PIK3CA, PTEN mutations). No adjuvant therapy. No signs or symptoms of recurrent disease. Continue surveillance with yearly exams until 5 years out. Resume yearly visits with Dr. Garrido between appointments.  [facility] in 1 year.    Morbid obesity: BMI 38. Emphasized importance of diet and exercise to achieve and maintain a healthy weight. Praised success thus far. Encouraged continued weight loss efforts.    Visit today is associated with current or anticipated ongoing medical care related to this patient's single serious condition/complex condition: endometrial carcinoma.     As part of the medical decision making process I reviewed the ENT notes from 10/12/23, 9/21/23, and 8/30/23.      .      Prasad Hathaway MD

## 2025-04-03 ENCOUNTER — TELEPHONE (OUTPATIENT)
Dept: GYNECOLOGIC ONCOLOGY | Facility: CLINIC | Age: 45
End: 2025-04-03
Payer: MEDICAID

## 2025-04-04 ENCOUNTER — TELEPHONE (OUTPATIENT)
Dept: GYNECOLOGIC ONCOLOGY | Facility: CLINIC | Age: 45
End: 2025-04-04
Payer: MEDICAID

## 2025-04-07 ENCOUNTER — TELEPHONE (OUTPATIENT)
Dept: GYNECOLOGIC ONCOLOGY | Facility: CLINIC | Age: 45
End: 2025-04-07
Payer: MEDICAID

## 2025-04-10 ENCOUNTER — TELEPHONE (OUTPATIENT)
Dept: GYNECOLOGIC ONCOLOGY | Facility: CLINIC | Age: 45
End: 2025-04-10
Payer: MEDICAID

## 2025-04-11 ENCOUNTER — TELEPHONE (OUTPATIENT)
Dept: GYNECOLOGIC ONCOLOGY | Facility: CLINIC | Age: 45
End: 2025-04-11
Payer: MEDICAID

## 2025-04-14 ENCOUNTER — TELEPHONE (OUTPATIENT)
Dept: GYNECOLOGIC ONCOLOGY | Facility: CLINIC | Age: 45
End: 2025-04-14
Payer: MEDICAID

## 2025-05-05 ENCOUNTER — HOSPITAL ENCOUNTER (OUTPATIENT)
Dept: RADIOLOGY | Facility: HOSPITAL | Age: 45
Discharge: HOME | End: 2025-05-05
Attending: ANESTHESIOLOGY
Payer: MEDICAID

## 2025-05-05 ENCOUNTER — OFFICE VISIT (OUTPATIENT)
Dept: PAIN MEDICINE | Facility: CLINIC | Age: 45
End: 2025-05-05
Payer: MEDICAID

## 2025-05-05 VITALS
OXYGEN SATURATION: 97 % | SYSTOLIC BLOOD PRESSURE: 126 MMHG | BODY MASS INDEX: 44.53 KG/M2 | DIASTOLIC BLOOD PRESSURE: 81 MMHG | HEIGHT: 62 IN | WEIGHT: 242 LBS | HEART RATE: 68 BPM

## 2025-05-05 DIAGNOSIS — M54.12 CERVICAL RADICULITIS: ICD-10-CM

## 2025-05-05 DIAGNOSIS — M25.78 DEGENERATION OF INTERVERTEBRAL DISC OF CERVICAL REGION WITH OSTEOPHYTE OF CERVICAL VERTEBRA: ICD-10-CM

## 2025-05-05 DIAGNOSIS — M54.9 DORSALGIA, UNSPECIFIED: ICD-10-CM

## 2025-05-05 DIAGNOSIS — M51.362 DEGENERATION OF INTERVERTEBRAL DISC OF LUMBAR REGION WITH DISCOGENIC BACK PAIN AND LOWER EXTREMITY PAIN: ICD-10-CM

## 2025-05-05 DIAGNOSIS — M54.16 LUMBAR RADICULITIS: ICD-10-CM

## 2025-05-05 DIAGNOSIS — M50.30 DEGENERATION OF INTERVERTEBRAL DISC OF CERVICAL REGION WITH OSTEOPHYTE OF CERVICAL VERTEBRA: ICD-10-CM

## 2025-05-05 DIAGNOSIS — M54.12 CERVICAL RADICULITIS: Primary | ICD-10-CM

## 2025-05-05 PROCEDURE — 72114 X-RAY EXAM L-S SPINE BENDING: CPT | Mod: 26,,, | Performed by: RADIOLOGY

## 2025-05-05 PROCEDURE — G2211 COMPLEX E/M VISIT ADD ON: HCPCS | Mod: S$PBB,,, | Performed by: ANESTHESIOLOGY

## 2025-05-05 PROCEDURE — 3008F BODY MASS INDEX DOCD: CPT | Mod: CPTII,,, | Performed by: ANESTHESIOLOGY

## 2025-05-05 PROCEDURE — 99204 OFFICE O/P NEW MOD 45 MIN: CPT | Mod: S$PBB,,, | Performed by: ANESTHESIOLOGY

## 2025-05-05 PROCEDURE — 72050 X-RAY EXAM NECK SPINE 4/5VWS: CPT | Mod: TC

## 2025-05-05 PROCEDURE — 72050 X-RAY EXAM NECK SPINE 4/5VWS: CPT | Mod: 26,,, | Performed by: RADIOLOGY

## 2025-05-05 PROCEDURE — 72114 X-RAY EXAM L-S SPINE BENDING: CPT | Mod: TC

## 2025-05-05 PROCEDURE — 1160F RVW MEDS BY RX/DR IN RCRD: CPT | Mod: CPTII,,, | Performed by: ANESTHESIOLOGY

## 2025-05-05 PROCEDURE — 3074F SYST BP LT 130 MM HG: CPT | Mod: CPTII,,, | Performed by: ANESTHESIOLOGY

## 2025-05-05 PROCEDURE — 99214 OFFICE O/P EST MOD 30 MIN: CPT | Mod: PBBFAC,25 | Performed by: ANESTHESIOLOGY

## 2025-05-05 PROCEDURE — 99999 PR PBB SHADOW E&M-EST. PATIENT-LVL IV: CPT | Mod: PBBFAC,,, | Performed by: ANESTHESIOLOGY

## 2025-05-05 PROCEDURE — 1159F MED LIST DOCD IN RCRD: CPT | Mod: CPTII,,, | Performed by: ANESTHESIOLOGY

## 2025-05-05 PROCEDURE — 3079F DIAST BP 80-89 MM HG: CPT | Mod: CPTII,,, | Performed by: ANESTHESIOLOGY

## 2025-05-05 RX ORDER — GABAPENTIN 600 MG/1
600 TABLET ORAL 3 TIMES DAILY
Qty: 90 TABLET | Refills: 11 | Status: SHIPPED | OUTPATIENT
Start: 2025-05-05 | End: 2026-05-05

## 2025-05-05 RX ORDER — SEMAGLUTIDE 1.34 MG/ML
1 INJECTION, SOLUTION SUBCUTANEOUS
COMMUNITY

## 2025-05-05 RX ORDER — IBUPROFEN 800 MG/1
800 TABLET ORAL 3 TIMES DAILY
COMMUNITY

## 2025-05-05 NOTE — PROGRESS NOTES
New Patient Evaluation  [facility] pain [facility]    Isidra Oliver  : 1980  Date: 2025     CHIEF COMPLAINT:  No chief complaint on file.    Referring Physician: Raz Otto PA-C  Primary Care Physician: Raz Otto PA-C    HPI:  This is a 44 y.o. female with a chief complaint of No chief complaint on file.  . The patient has Past medical history/Past surgical history of   MRI, obesity, tobacco use disorder, diabetes, migraine    Patient was evaluated and referred by ***    Diabetic: {GAYes/No/NA:20706}    {Anticoagulation medications:97490}    Allergy To Iodine: {GAYes/No/NA:42992}    Currently on Antibiotic: {GAYes/No/NA:89689}    Pain Disability Index Review:       No data to display                Current Description of Pain Symptoms:    History of Recent Fall or Trauma: {GAYes/No/NA:62446}   Onset: Chronic, started ***  Pain Location: ***  Radiates/associated symptoms: ***.   Pain is Getting worse over the last *** months   The pain is {Intermittent/Continuous:99699}.   The pain is described as {Desc; pain character:81258}.   Exacerbating factors: {Causes; Pain:40476}.   Mitigating factors ***.   Symptoms interfere with daily activity, sleeping, and ***.   The patient feels like symptoms have been {IUW:07652}.   Patient {Denies / Reports:45138} {RED FLAGS:10161}.    Pain score:   Current: {PAIN 0-10:54342}/10  Best: {PAIN 0-10:48079}/10  Worst: {PAIN 0-10:36817}/10    Current pain medication:        Current Narcotics/Opioid /benzo Medications:  Opioids- {GAopioid:79492}  Benzodiazepines: {GAYes/No/NA:79224}    UDS:  NA    PDMP:  {:00250}     Previous Chronic Pain Treatment History:  Six weeks of conservative therapy include: Physical Therapy/HEP/Physician Lead Exercise Program:  Over the past 12 months, Patient has done  *** sessions.  PT response: {PT response:77228} Helpful.   Dates of the PT sessions: ***, ***.  Is patient actively participating in home exercise  program (HEP)/ physician led exercise program in the last 6 months: {GAYes/No/NA:69946}.    Non-interventional Pain Therapy:  []Chiropractor.   []Acupuncture/Dry needle.  []TENS unit.  []Heat/ICE.  []Back Brace.    Medications previously tried:  NSAIDs: {GANSIAD:98579}  Topical Agent: {GAYes/No/NA:68866}  TCA/SSRI/SNRI: {GATCA/SSRI/SNRI:97398}  Anti-convulsants: {GAAnticonvulsants:71912}  Muscle Relaxants: {GAmuscle Relaxant:03659}  Opioids- {GAopioid:37310}.    Interventional Pain Procedures:  ***    Previous spine/Relevant joint surgery:  ***  Surgical History:   has a past surgical history that includes  section; Gallbladder surgery; Tubal ligation; Robot-assisted laparoscopic abdominal hysterectomy using da Bhavna Xi (N/A, 2023); Robot-assisted laparoscopic salpingo-oophorectomy using da Bhavna Xi (Bilateral, 2023); mapping, lymph node, sentinel (N/A, 2023); Lymph node biopsy (N/A, 2023); and Robot-assisted laparoscopic lysis of adhesions using da Bhavna Xi (N/A, 2023).  Medical History:   has a past medical history of Asthma, Back pain, and Heart attack ().  Family History:  family history is not on file.  Allergies:  Patient has no known allergies.   Social History/SUBSTANCE ABUSE HISTORY:  Personal history of substance abuse: No   reports that she has been smoking cigarettes. She has a 22.5 pack-year smoking history. She does not have any smokeless tobacco history on file. She reports that she does not drink alcohol and does not use drugs.  LABS:  CBC  Lab Results   Component Value Date    WBC 16.22 (H) 2023    HGB 10.7 (L) 2023    HCT 33.5 (L) 2023     Coagulation Profile   Lab Results   Component Value Date     (H) 2023       Lab Results   Component Value Date    INR 0.9 2023     CMP:  BMP  Lab Results   Component Value Date     2023    K 3.8 2023     2023    CO2 22 (L) 2023    BUN 8 2023     CREATININE 0.8 05/06/2023    CALCIUM 8.7 05/06/2023    ANIONGAP 8 05/06/2023    EGFRNORACEVR >60 05/06/2023     Lab Results   Component Value Date    ALT 31 05/06/2023    AST 19 05/06/2023    ALKPHOS 96 05/06/2023    BILITOT 0.2 05/06/2023     HGBA1C:  Lab Results   Component Value Date    HGBA1C 6.4 (H) 05/05/2023       ROS:    Review of Systems   GENERAL:  No weight loss, malaise or fevers.  HEENT:   No recent changes in vision or hearing  NECK:  Negative for lumps, no difficulty with swallowing.  RESPIRATORY:  Negative for cough, wheezing or shortness of breath, patient denies any recent URI.  CARDIOVASCULAR:  Negative for chest pain or palpitations.  GI:  Negative for abdominal discomfort, blood in stools or black stools or change in bowel habits.  MUSCULOSKELETAL:  See HPI.  SKIN:  Negative for lesions, rash, and itching.  PSYCH:  No mood disorder or recent psychosocial stressors.   HEMATOLOGY/LYMPHOLOGY:  See the blood thinner sectioned in HPI.  NEURO:  See HPI  All other reviewed and negative other than HPI.    PHYSICAL EXAM:  VITALS: LMP 04/26/2023   There is no height or weight on file to calculate BMI.  GENERAL: Well appearing, in no acute distress, alert and oriented x3, answers questions appropriately.   PSYCH: Flat affect.  SKIN: Skin color, texture, turgor normal, no rashes or lesions.  HEAD/FACE:  Normocephalic, atraumatic. Cranial nerves grossly intact.  CV: Regular rate  PULM: No evidence of respiratory difficulty, symmetric chest rise.  GI:  Soft and non-Distended.  NECK: ({GA+:51120}) pain to palpation over the cervical paraspinous muscles. Spurling:{GA+:21997}. ({GA+:44115}) pain with neck flexion, extension, or lateral flexion, Muscle strength in RT UE ***/5 and Left UE ***/5, Right Hand  ***/5, Left Hand ***/5  BACK/SIJ/HIP:  Lumbar Spine Exam:       Inspection: No erythema, bruising.       Palpation: ({GA+:67536}) TTP of lumbar paraspinals bilaterally      ROM:  Limited in flexion,  extension, lateral bending.       ({GA+:48451}) Facet loading {GAHip:48263}      ({GA+:02914}) Straight Leg Raise, {GAHip:73877}      ({GA+:83263}) AILYN, Tenderness over the PSIS, Yeoman test, {GAHip:68602}  Hip Exam:      Inspection: No gross deformity or apparent leg length discrepancy      Palpation:  No TTP to bilateral greater trochanteric bursas.       ROM:  *** limitation Due to pain in internal rotation, external rotation b/l  Neurologic Exam:     Alert. Speech is fluent and appropriate.     Strength: ***/5 in {GAHip:77377} hip flexion and knee extension     Sensation:  Grossly intact to light touch in bilateral lower extremities     Tone: No abnormality appreciated in bilateral lower extremities  Knee exam:  No gross deformity or apparent leg length discrepancy, positive tenderness over the anteromedial aspect of the knee cap, positive limitation due to pain in flexion and extension, sensation  grossly intact to light touch in bilateral lower extremity, no atrophy or tone abnormalities    GAIT: {GAgait:01791}    DIAGNOSTIC STUDIES AND MEDICAL RECORDS REVIEW:  I have personally reviewed and interpreted relevant radiology reports and reviewed relevant records from other services in the EMR.   ***  Clinical Impression:  This is a pleasant 44 y.o. female patient with PMH/PSH of ***, presenting with***.     We discussed the underlying diagnoses and multiple treatment options including non-opioid medications, interventional procedures, physical therapy, home exercise, core muscle enhancement, and weight loss.  The risks and benefits of each treatment option were discussed and all questions were answered.      Encounter Diagnosis:  Isidra Oliver is a 44 y.o. female with the following diagnoses based on history, exam, and imaging:  There are no diagnoses linked to this encounter.     Treatment Plan:    Diagnostics/Referrals: {gaimage:03940}    Medications:    NSAIDs: {GANSIAD:90287}  Topical Agent:  "{GAYes/No/NA:39889}  TCA/SSRI/SNRI: {GATCA/SSRI/SNRI:45320}  Anti-convulsants: {GAAnticonvulsants:90714}  Muscle Relaxants: {GAmuscle Relaxant:29602}  Opioids: {GAopioid:18091::"None"}  Patient was educated about the risk and benefit of chronic opioid therapy including dependency, addiction, diversion, and opioid hyperalgesia.  Interventional Therapy: {GAProcedure:10582}.  Sedation: {GAsedation:06121}.  {Anticoagulation medications:18576} -Clearance to stop Blood thinner: {GAYes/No/NA:12047}    Regarding the above interventions, the patient has been educated regarding the risks (including bleeding, infection, increased pain, nerve damage, or allergic reaction), benefits, and alternatives. The patient states she understands and is eager to proceed.    Physical Rehabilitation: {GAPT:01803}    Patient Education: Counseled patient regarding the importance of {:46759}, I have stressed the importance of physical activity and a home exercise plan to help with pain and improve health.    Follow-up: RTC ***.    May consider:     I would like to thank Raz Otto PA-C for the opportunity to assist in the care of this patient. We had a very nice visit and I look forward to continuing their care. Please let me know if I can be of further assistance.     Mukesh Marie MD  Anesthesiologist  Interventional Pain Medicine  05/05/2025    Disclaimer:  This note was prepared using voice recognition system and is likely to have sound alike errors that may have been overlooked even after proof reading.  Please call me with any questions.    "

## 2025-05-05 NOTE — PROGRESS NOTES
New Patient Evaluation  [facility]    Isidra Oliver  : 1980  Date: 2025     CHIEF COMPLAINT:  Low-back Pain and Cervical Spine Pain (C-spine)    Referring Physician: Raz Otto PA-C  Primary Care Physician: Raz Otto PA-C    HPI:  This is a 44 y.o. female with a chief complaint of Low-back Pain and Cervical Spine Pain (C-spine)  . The patient has Past medical history/Past surgical history of   MI, obesity, tobacco use disorder, diabetes, migraine, cervical cancer s/p hysterectomy     Patient was evaluated and referred by PCP low back pain and  bilateral lower extremity radiation in addition to neck pain and right upper extremity radiation associated with weak handgrip.    Diabetic: Yes    Anticoagulation medications: None    Allergy To Iodine: No    Currently on Antibiotic: No    Pain Disability Index Review:      2025    10:35 AM   Last 3 PDI Scores   Pain Disability Index (PDI) 42     Current Description of Pain Symptoms:    History of Recent Fall or Trauma: No   Onset: Chronic, started on going  Pain Location: Lower back  and neck pain  Radiates/associated symptoms: BL LE to the feet, neck and RT UE associated with lose of hand strength   Pain is Getting worse over the last 3 months   The pain is continuous.   The pain is described as aching, burning, numbing, sharp, shooting, stabbing, and tingling.   Exacerbating factors: Sitting, Standing, Laying, Bending, Walking, Night Time, Morning, Lifting, and Getting out of bed/chair.   Mitigating factors N/A.   Symptoms interfere with daily activity, sleeping.   The patient feels like symptoms have been worsening.   Patient denies night fever/night sweats, urinary incontinence, bowel incontinence, significant weight loss, significant motor weakness, and loss of sensations.  Weak Right hand      Pain score:   Current: 7/10  Best: 7/10  Worst: 10/10    Current pain medication:  Tylenol 500mg,  PRN  Ibuprofen 800mg, PRN    Current Narcotics/Opioid /benzo Medications:  Opioids- None  Benzodiazepines: No    UDS:  NA    PDMP:  Reviewed and consistent with medication use as prescribed.     Previous Chronic Pain Treatment History:  Six weeks of conservative therapy include: Physical Therapy/HEP/Physician Lead Exercise Program:  Over the past 12 months, Patient has done  3 sessions.  PT response: Not Helpful. Had to stop due to pain.   Dates of the PT sessions: 2024.  Is patient actively participating in home exercise program (HEP)/ physician led exercise program in the last 6 months: Yes.    Non-interventional Pain Therapy:  []Chiropractor.   []Acupuncture/Dry needle.  [x]TENS unit.  [x]Heat/ICE.  []Back Brace.    Medications previously tried:  NSAIDs: OTC and Ibuprofen (Advil/Motrin)  Topical Agent: Yes  TCA/SSRI/SNRI: None  Anti-convulsants: Gabapentin : no relief   Muscle Relaxants: Robaxin (methocarbamol )  and Tizanidine (Zanaflex)  Opioids- Oxycodone with Acetaminophen (Percocet) and Hydrocodone with Acetaminophen (Norco).    Interventional Pain Procedures:  N/A    Previous spine/Relevant joint surgery:  N/A  Surgical History:   has a past surgical history that includes  section; Gallbladder surgery; Tubal ligation; Robot-assisted laparoscopic abdominal hysterectomy using da Bhavna Xi (N/A, 2023); Robot-assisted laparoscopic salpingo-oophorectomy using da Bhavna Xi (Bilateral, 2023); mapping, lymph node, sentinel (N/A, 2023); Lymph node biopsy (N/A, 2023); and Robot-assisted laparoscopic lysis of adhesions using da Bhavna Xi (N/A, 2023).  Medical History:   has a past medical history of Asthma, Back pain, and Heart attack ().  Family History:  family history is not on file.  Allergies:  Patient has no known allergies.   Social History/SUBSTANCE ABUSE HISTORY:  Personal history of substance abuse: No   reports that she has been smoking cigarettes. She has a 22.5  "pack-year smoking history. She does not have any smokeless tobacco history on file. She reports that she does not drink alcohol and does not use drugs.  LABS:  CBC  Lab Results   Component Value Date    WBC 16.22 (H) 05/08/2023    HGB 10.7 (L) 05/08/2023    HCT 33.5 (L) 05/08/2023     Coagulation Profile   Lab Results   Component Value Date     (H) 05/08/2023       Lab Results   Component Value Date    INR 0.9 05/08/2023     CMP:  BMP  Lab Results   Component Value Date     05/06/2023    K 3.8 05/06/2023     05/06/2023    CO2 22 (L) 05/06/2023    BUN 8 05/06/2023    CREATININE 0.8 05/06/2023    CALCIUM 8.7 05/06/2023    ANIONGAP 8 05/06/2023    EGFRNORACEVR >60 05/06/2023     Lab Results   Component Value Date    ALT 31 05/06/2023    AST 19 05/06/2023    ALKPHOS 96 05/06/2023    BILITOT 0.2 05/06/2023     HGBA1C:  Lab Results   Component Value Date    HGBA1C 6.4 (H) 05/05/2023       ROS:    Review of Systems   GENERAL:  No weight loss, malaise or fevers.  HEENT:   No recent changes in vision or hearing  NECK:  Negative for lumps, no difficulty with swallowing.  RESPIRATORY:  Negative for cough, wheezing or shortness of breath, patient denies any recent URI.  CARDIOVASCULAR:  Negative for chest pain or palpitations.  GI:  Negative for abdominal discomfort, blood in stools or black stools or change in bowel habits.  MUSCULOSKELETAL:  See HPI.  SKIN:  Negative for lesions, rash, and itching.  PSYCH:  No mood disorder or recent psychosocial stressors.   HEMATOLOGY/LYMPHOLOGY:  See the blood thinner sectioned in HPI.  NEURO:  See HPI  All other reviewed and negative other than HPI.    PHYSICAL EXAM:  VITALS: /81 (BP Location: Right arm, Patient Position: Sitting)   Pulse 68   Ht 5' 2" (1.575 m)   Wt 109.8 kg (242 lb)   LMP 04/26/2023   SpO2 97%   BMI 44.26 kg/m²   Body mass index is 44.26 kg/m².  GENERAL: Well appearing, in no acute distress, alert and oriented x3, answers questions " appropriately.   PSYCH: Flat affect.  SKIN: Skin color, texture, turgor normal, no rashes or lesions.  HEAD/FACE:  Normocephalic, atraumatic. Cranial nerves grossly intact.  CV: Regular rate  PULM: No evidence of respiratory difficulty, symmetric chest rise.  GI:  Soft and non-Distended.    BACK/SIJ/HIP:  Lumbar Spine Exam:       Inspection: No erythema, bruising.       Palpation: (+++) TTP of lumbar paraspinals bilaterally      ROM:  Limited in flexion, extension, lateral bending.       (+++) Facet loading bilateral      (+) Straight Leg Raise, bilateral      (+) AILYN, Tenderness over the PSIS, Yeoman test, bilateral  Hip Exam:      Inspection: No gross deformity or apparent leg length discrepancy      Palpation:  No TTP to bilateral greater trochanteric bursas.       ROM:  no limitation Due to pain in internal rotation, external rotation b/l  Neurologic Exam:     Alert. Speech is fluent and appropriate.     Strength: 5/5 in bilateral hip flexion and knee extension     Sensation:  Grossly intact to light touch in bilateral lower extremities     Tone: No abnormality appreciated in bilateral lower extremities    CERVICAL SPINE AND BILATERAL UPPER EXTREMITY EXAM:   INSPECTION: No gross deformities or abnormalities noted.   RANGE OF MOTION:  limited  STABILITY: No instability noted/appreciated.   MYOFASCIAL EXAM: +  SPURLING: +  right upper extremity  FACET LOADING:  mild positive  MUSCLE STRENGTH: 4/5  right upper extremity  SENSORY EXAM: Intact to light touch, bilateral upper extremities.   WEBBER'S: Negative bilaterally.     GAIT:  normal gait    DIAGNOSTIC STUDIES AND MEDICAL RECORDS REVIEW:  I have personally reviewed and interpreted relevant radiology reports and reviewed relevant records from other services in the EMR.       Clinical Impression:  This is a pleasant 44 y.o. female patient with PMH/PSH of   MI, obesity, tobacco use disorder, diabetes, migraine, cervical cancer s/p hysterectomy , presenting with  neck and low back pain with upper and lower extremity radiation:  right upper extremity radiation associated with weak right hand  in addition to right shoulder pain and right chest pain+  bilateral lower extremity radiation to the feet, patient has tried and failed formal physical therapy, she has history of cervical cancer, I would recommend obtaining MRI cervical and lumbar spine.     Encounter Diagnosis:  Isidra Oliver is a 44 y.o. female with the following diagnoses based on history, exam, and imagin. Cervical radiculitis  - X-Ray Cervical Spine AP Lat with Flex Ex; Future  - MRI Cervical Spine Without Contrast; Future    2. Dorsalgia, unspecified  - MRI Lumbar Spine Without Contrast; Future  - X-Ray Lumbar Complete Including Flex And Ext; Future  - gabapentin (NEURONTIN) 600 MG tablet; Take 1 tablet (600 mg total) by mouth 3 (three) times daily.  Dispense: 90 tablet; Refill: 11    3. Lumbar radiculitis    4. Degeneration of intervertebral disc of cervical region with osteophyte of cervical vertebra    5. Degeneration of intervertebral disc of lumbar region with discogenic back pain and lower extremity pain     Treatment Plan:    Diagnostics/Referrals: X-ray lumbar spine flexion-extension and MRI lumbar spine+   x-ray cervical and MRI cervical spine    Medications:    NSAIDs:  ibuprofen as needed for pain  Topical Agent: No  TCA/SSRI/SNRI: None  Anti-convulsants:  start gabapentin 600 mg 3 times a day, prescription was provided  Muscle Relaxants: None  Opioids: None    Interventional Therapy: Procedure based on MRI images.  Sedation: NA.  Anticoagulation medications: None -Clearance to stop Blood thinner: NA    Regarding the above interventions, the patient has been educated regarding the risks (including bleeding, infection, increased pain, nerve damage, or allergic reaction), benefits, and alternatives. The patient states she understands and is eager to proceed.    Physical Rehabilitation:   continue with a home exercise    Patient Education: Counseled patient regarding the importance of activity modification, I have stressed the importance of physical activity and a home exercise plan to help with pain and improve health.    Follow-up: RTC  to discuss images.    May consider: ASHWIN and Arelis    I would like to thank Raz Otto PA-C for the opportunity to assist in the care of this patient. We had a very nice visit and I look forward to continuing their care. Please let me know if I can be of further assistance.     Mukesh Marie MD  Anesthesiologist  Interventional Pain Medicine  05/05/2025    Disclaimer:  This note was prepared using voice recognition system and is likely to have sound alike errors that may have been overlooked even after proof reading.  Please call me with any questions.

## 2025-05-15 ENCOUNTER — HOSPITAL ENCOUNTER (OUTPATIENT)
Dept: RADIOLOGY | Facility: HOSPITAL | Age: 45
Discharge: HOME | End: 2025-05-15
Attending: ANESTHESIOLOGY
Payer: MEDICAID

## 2025-05-15 ENCOUNTER — TELEPHONE (OUTPATIENT)
Dept: PAIN MEDICINE | Facility: CLINIC | Age: 45
End: 2025-05-15
Payer: MEDICAID

## 2025-05-15 DIAGNOSIS — M54.12 CERVICAL RADICULITIS: ICD-10-CM

## 2025-05-15 DIAGNOSIS — M54.9 DORSALGIA, UNSPECIFIED: ICD-10-CM

## 2025-05-15 PROCEDURE — 72141 MRI NECK SPINE W/O DYE: CPT | Mod: TC

## 2025-05-15 PROCEDURE — 72148 MRI LUMBAR SPINE W/O DYE: CPT | Mod: 26,,, | Performed by: RADIOLOGY

## 2025-05-15 PROCEDURE — 72141 MRI NECK SPINE W/O DYE: CPT | Mod: 26,,, | Performed by: RADIOLOGY

## 2025-05-15 PROCEDURE — 72148 MRI LUMBAR SPINE W/O DYE: CPT | Mod: TC

## 2025-05-15 NOTE — TELEPHONE ENCOUNTER
----- Message from Mukesh Marie MD sent at 5/15/2025  3:22 PM CDT -----   Please schedule her office visit 05/19  to discuss MRI, please confirm when you schedule

## 2025-05-19 ENCOUNTER — OFFICE VISIT (OUTPATIENT)
Dept: PAIN MEDICINE | Facility: CLINIC | Age: 45
End: 2025-05-19
Payer: MEDICAID

## 2025-05-19 ENCOUNTER — TELEPHONE (OUTPATIENT)
Dept: PAIN MEDICINE | Facility: CLINIC | Age: 45
End: 2025-05-19

## 2025-05-19 ENCOUNTER — TELEPHONE (OUTPATIENT)
Dept: PAIN MEDICINE | Facility: CLINIC | Age: 45
End: 2025-05-19
Payer: MEDICAID

## 2025-05-19 VITALS
SYSTOLIC BLOOD PRESSURE: 160 MMHG | BODY MASS INDEX: 44.53 KG/M2 | OXYGEN SATURATION: 96 % | WEIGHT: 242 LBS | HEIGHT: 62 IN | HEART RATE: 80 BPM | DIASTOLIC BLOOD PRESSURE: 99 MMHG

## 2025-05-19 DIAGNOSIS — M54.16 LUMBAR RADICULOPATHY: ICD-10-CM

## 2025-05-19 DIAGNOSIS — M51.26 LUMBAR HERNIATED DISC: Primary | ICD-10-CM

## 2025-05-19 DIAGNOSIS — M54.16 LUMBAR RADICULITIS: Primary | ICD-10-CM

## 2025-05-19 DIAGNOSIS — M54.16 LUMBAR RADICULITIS: ICD-10-CM

## 2025-05-19 DIAGNOSIS — M51.362 DEGENERATION OF INTERVERTEBRAL DISC OF LUMBAR REGION WITH DISCOGENIC BACK PAIN AND LOWER EXTREMITY PAIN: ICD-10-CM

## 2025-05-19 DIAGNOSIS — M48.061 LUMBAR FORAMINAL STENOSIS: ICD-10-CM

## 2025-05-19 PROCEDURE — 99213 OFFICE O/P EST LOW 20 MIN: CPT | Mod: PBBFAC | Performed by: ANESTHESIOLOGY

## 2025-05-19 PROCEDURE — 1160F RVW MEDS BY RX/DR IN RCRD: CPT | Mod: CPTII,,, | Performed by: ANESTHESIOLOGY

## 2025-05-19 PROCEDURE — 3080F DIAST BP >= 90 MM HG: CPT | Mod: CPTII,,, | Performed by: ANESTHESIOLOGY

## 2025-05-19 PROCEDURE — 3077F SYST BP >= 140 MM HG: CPT | Mod: CPTII,,, | Performed by: ANESTHESIOLOGY

## 2025-05-19 PROCEDURE — 3008F BODY MASS INDEX DOCD: CPT | Mod: CPTII,,, | Performed by: ANESTHESIOLOGY

## 2025-05-19 PROCEDURE — 99999 PR PBB SHADOW E&M-EST. PATIENT-LVL III: CPT | Mod: PBBFAC,,, | Performed by: ANESTHESIOLOGY

## 2025-05-19 PROCEDURE — 1159F MED LIST DOCD IN RCRD: CPT | Mod: CPTII,,, | Performed by: ANESTHESIOLOGY

## 2025-05-19 PROCEDURE — 99214 OFFICE O/P EST MOD 30 MIN: CPT | Mod: S$PBB,,, | Performed by: ANESTHESIOLOGY

## 2025-05-19 PROCEDURE — G2211 COMPLEX E/M VISIT ADD ON: HCPCS | Mod: S$PBB,,, | Performed by: ANESTHESIOLOGY

## 2025-05-19 RX ORDER — TRAMADOL HYDROCHLORIDE 50 MG/1
50 TABLET, FILM COATED ORAL EVERY 6 HOURS
Qty: 20 TABLET | Refills: 0 | Status: SHIPPED | OUTPATIENT
Start: 2025-05-19

## 2025-05-19 RX ORDER — GABAPENTIN 800 MG/1
800 TABLET ORAL 3 TIMES DAILY
Qty: 90 TABLET | Refills: 11 | Status: SHIPPED | OUTPATIENT
Start: 2025-05-19 | End: 2026-05-19

## 2025-05-19 NOTE — PROGRESS NOTES
EST Patient Evaluation  [facility]    Isidra Oliver  : 1980  Date: 2025     CHIEF COMPLAINT:  No chief complaint on file.    Referring Physician: Self, Aaareferral  Primary Care Physician: Raz Otto PA-C    HPI:  This is a 44 y.o. female with a chief complaint of No chief complaint on file.  . The patient has Past medical history/Past surgical history of   MI, obesity, tobacco use disorder, diabetes, migraine, cervical cancer s/p hysterectomy     Patient was evaluated and referred by PCP low back pain and  bilateral lower extremity radiation in addition to neck pain and right upper extremity radiation associated with weak handgrip.    Interval History 2025:  Isidra Oliver is here for follow up visit discuss MRI lumbar spine that showed significant finding at L4-L5 and L5-S1;  and L4-L5left foramina disc protrusion and facet arthropathy contributing to mild bilateral neural foramina narrowing and impingement of the exiting left L4 nerve root, at L5-S1 mild central canal stenosis with impingement upon the bilateral descending S1 nerve root left worse than the right with moderate bilateral neural foramina narrowing.    MRI cervical spine showed minimal posterior disc osteophyte complex with no central or neural foramina narrowing with mild degenerative changes at C6-C7    Current pain score: ***/10      Diabetic: Yes    Anticoagulation medications: None    Allergy To Iodine: No    Currently on Antibiotic: No    Pain Disability Index Review:      2025    10:35 AM   Last 3 PDI Scores   Pain Disability Index (PDI) 42     Current Description of Pain Symptoms:    History of Recent Fall or Trauma: No   Onset: Chronic, started on going  Pain Location: Lower back  and neck pain  Radiates/associated symptoms: BL LE to the feet, neck and RT UE associated with lose of hand strength   Pain is Getting worse over the last 3 months   The pain is continuous.    The pain is described as aching, burning, numbing, sharp, shooting, stabbing, and tingling.   Exacerbating factors: Sitting, Standing, Laying, Bending, Walking, Night Time, Morning, Lifting, and Getting out of bed/chair.   Mitigating factors N/A.   Symptoms interfere with daily activity, sleeping.   The patient feels like symptoms have been worsening.   Patient denies night fever/night sweats, urinary incontinence, bowel incontinence, significant weight loss, significant motor weakness, and loss of sensations.  Weak Right hand      Pain score:   Best: 7/10  Worst: 10/10    Current pain medication:  Tylenol 500mg, PRN  Ibuprofen 800mg, PRN    Current Narcotics/Opioid /benzo Medications:  Opioids- None  Benzodiazepines: No    UDS:  NA    PDMP:  Reviewed and consistent with medication use as prescribed.     Previous Chronic Pain Treatment History:  Six weeks of conservative therapy include: Physical Therapy/HEP/Physician Lead Exercise Program:  Over the past 12 months, Patient has done  3 sessions.  PT response: Not Helpful. Had to stop due to pain.   Dates of the PT sessions: 2024.  Is patient actively participating in home exercise program (HEP)/ physician led exercise program in the last 6 months: Yes.    Non-interventional Pain Therapy:  []Chiropractor.   []Acupuncture/Dry needle.  [x]TENS unit.  [x]Heat/ICE.  []Back Brace.    Medications previously tried:  NSAIDs: OTC and Ibuprofen (Advil/Motrin)  Topical Agent: Yes  TCA/SSRI/SNRI: None  Anti-convulsants: Gabapentin : no relief   Muscle Relaxants: Robaxin (methocarbamol )  and Tizanidine (Zanaflex)  Opioids- Oxycodone with Acetaminophen (Percocet) and Hydrocodone with Acetaminophen (Norco).    Interventional Pain Procedures:  N/A    Previous spine/Relevant joint surgery:  N/A  Surgical History:   has a past surgical history that includes  section; Gallbladder surgery; Tubal ligation; Robot-assisted laparoscopic abdominal hysterectomy using da  Bhavna Xi (N/A, 4/26/2023); Robot-assisted laparoscopic salpingo-oophorectomy using da Bhavna Xi (Bilateral, 4/26/2023); mapping, lymph node, sentinel (N/A, 4/26/2023); Lymph node biopsy (N/A, 4/26/2023); and Robot-assisted laparoscopic lysis of adhesions using da Bhavna Xi (N/A, 4/26/2023).  Medical History:   has a past medical history of Asthma, Back pain, and Heart attack (2020).  Family History:  family history is not on file.  Allergies:  Patient has no known allergies.   Social History/SUBSTANCE ABUSE HISTORY:  Personal history of substance abuse: No   reports that she has been smoking cigarettes. She has a 22.5 pack-year smoking history. She does not have any smokeless tobacco history on file. She reports that she does not drink alcohol and does not use drugs.  LABS:  CBC  Lab Results   Component Value Date    WBC 16.22 (H) 05/08/2023    HGB 10.7 (L) 05/08/2023    HCT 33.5 (L) 05/08/2023     Coagulation Profile   Lab Results   Component Value Date     (H) 05/08/2023       Lab Results   Component Value Date    INR 0.9 05/08/2023     CMP:  BMP  Lab Results   Component Value Date     05/06/2023    K 3.8 05/06/2023     05/06/2023    CO2 22 (L) 05/06/2023    BUN 8 05/06/2023    CREATININE 0.8 05/06/2023    CALCIUM 8.7 05/06/2023    ANIONGAP 8 05/06/2023    EGFRNORACEVR >60 05/06/2023     Lab Results   Component Value Date    ALT 31 05/06/2023    AST 19 05/06/2023    ALKPHOS 96 05/06/2023    BILITOT 0.2 05/06/2023     HGBA1C:  Lab Results   Component Value Date    HGBA1C 6.4 (H) 05/05/2023       ROS:    Review of Systems   GENERAL:  No weight loss, malaise or fevers.  HEENT:   No recent changes in vision or hearing  NECK:  Negative for lumps, no difficulty with swallowing.  RESPIRATORY:  Negative for cough, wheezing or shortness of breath, patient denies any recent URI.  CARDIOVASCULAR:  Negative for chest pain or palpitations.  GI:  Negative for abdominal discomfort, blood in stools or black  stools or change in bowel habits.  MUSCULOSKELETAL:  See HPI.  SKIN:  Negative for lesions, rash, and itching.  PSYCH:  No mood disorder or recent psychosocial stressors.   HEMATOLOGY/LYMPHOLOGY:  See the blood thinner sectioned in HPI.  NEURO:  See HPI  All other reviewed and negative other than HPI.    PHYSICAL EXAM:  VITALS: [facility] 04/26/2023   There is no height or weight on file to calculate BMI.  GENERAL: Well appearing, in no acute distress, alert and oriented x3, answers questions appropriately.   PSYCH: Flat affect.  SKIN: Skin color, texture, turgor normal, no rashes or lesions.  HEAD/FACE:  Normocephalic, atraumatic. Cranial nerves grossly intact.  CV: Regular rate  PULM: No evidence of respiratory difficulty, symmetric chest rise.  GI:  Soft and non-Distended.    BACK/SIJ/HIP:  Lumbar Spine Exam:       Inspection: No erythema, bruising.       Palpation: (+++) TTP of lumbar paraspinals bilaterally      ROM:  Limited in flexion, extension, lateral bending.       (+++) Facet loading bilateral      (+) Straight Leg Raise, bilateral      (+) AILYN, Tenderness over the PSIS, Yeoman test, bilateral  Hip Exam:      Inspection: No gross deformity or apparent leg length discrepancy      Palpation:  No TTP to bilateral greater trochanteric bursas.       ROM:  no limitation Due to pain in internal rotation, external rotation b/l  Neurologic Exam:     Alert. Speech is fluent and appropriate.     Strength: 5/5 in bilateral hip flexion and knee extension     Sensation:  Grossly intact to light touch in bilateral lower extremities     Tone: No abnormality appreciated in bilateral lower extremities    CERVICAL SPINE AND BILATERAL UPPER EXTREMITY EXAM:   INSPECTION: No gross deformities or abnormalities noted.   RANGE OF MOTION:  limited  STABILITY: No instability noted/appreciated.   MYOFASCIAL EXAM: +  SPURLING: +  right upper extremity  FACET LOADING:  mild positive  MUSCLE STRENGTH: 4/5  right upper extremity  SENSORY  EXAM: Intact to light touch, bilateral upper extremities.   WEBBER'S: Negative bilaterally.     GAIT:  normal gait    DIAGNOSTIC STUDIES AND MEDICAL RECORDS REVIEW:  I have personally reviewed and interpreted relevant radiology reports and reviewed relevant records from other services in the EMR.       -  MRI lumbar spine  Study limited by motion artifact.     The incidentally visualized soft tissue structures of the abdomen have a normal appearance.     Vertebral body alignment and heights are maintained.  There is disc desiccation at L4-5 and L5-S1 with disc space narrowing at L5-S1.  Edema like signal in the posterior elements bilaterally at L4 and L5, more so on the left, which can be a source of pain.  The marrow signal is otherwise normal without evidence for a marrow replacement process, infection or tumor.  The conus terminates at L1-2.     At T12-L1, no disc herniation, central canal stenosis or neural foraminal narrowing.     At L1-2, no disc herniation, central canal stenosis or neural foraminal narrowing.     At L2-3, mild facet arthropathy.  No disc herniation, central canal stenosis or neural foraminal narrowing.     At L3-4, mild bilateral facet arthropathy.  No disc herniation, central canal stenosis or neural foraminal narrowing.     At L4-5, minimal disc bulging, most pronounced in the foraminal regions bilaterally with a superimposed left foraminal disc protrusion and facet arthropathy contributing to mild bilateral neural foraminal narrowing.  There is likely some impingement upon the exiting left L4 nerve root.     At L5-S1, there is a circumferential disc bulge with a superimposed central/left paracentral disc protrusion and facet arthropathy.  Findings contribute to mild central canal stenosis with impingement upon the bilateral descending S1 nerve roots, left greater than right.  There is also moderate bilateral neural foraminal narrowing.     Impression:     Degenerative changes of the  lumbar spine, most pronounced at L4-5 and L5-S1 levels contributing to central canal stenosis and neural foraminal narrowing.  Of note there does appear to be some impingement upon the exiting left L4 nerve root as well as the bilateral descending S1 nerve roots, left greater than right.  Please see above report for level by level description.     Prominent edema like signal in the posterior elements bilaterally at L4 and L5, more pronounced on the left, which can be a source of pain.    - MRI cervical spine    Motion limited study.     The incidentally visualized soft tissue structures of the neck have a normal appearance.     The craniocervical junction is intact.  There is no evidence for a Chiari malformation.  This the spinal cord is normal in signal without cord edema or myelomalacia.     Straightening of the normal cervical lordosis.  Vertebral body heights are maintained.  Disc desiccation throughout the cervical spine.  The marrow signal is normal without evidence for a marrow replacement process, infection or tumor.     At C2-3, no disc herniation, central canal stenosis or neural foraminal narrowing.     At C3-4, no disc herniation, central canal stenosis or neural foraminal narrowing.     At C4-5, no disc herniation, central canal stenosis or neural foraminal narrowing.     At C5-6, no disc herniation, central canal stenosis or neural foraminal narrowing.     At C6-7, posterior disc osteophyte complex with uncovertebral spurring.  No central canal stenosis or neural foraminal narrowing.     At C7-T1, no disc herniation, central canal stenosis or neural foraminal narrowing.     Impression:     Minimal degenerative change at C6-7.  No central canal stenosis or neural foraminal narrowing.  Clinical Impression:  This is a pleasant 44 y.o. female patient with PMH/PSH of   MI, obesity, tobacco use disorder, diabetes, migraine, cervical cancer s/p hysterectomy , presenting with neck and low back pain with upper  and lower extremity radiation:  right upper extremity radiation associated with weak right hand  in addition to right shoulder pain and right chest pain+  bilateral lower extremity radiation to the feet, patient has tried and failed formal physical therapy, she has history of cervical cancer, I would recommend obtaining MRI cervical and lumbar spine.     Encounter Diagnosis:  Isidra Oliver is a 44 y.o. female with the following diagnoses based on history, exam, and imaging:  There are no diagnoses linked to this encounter.     Treatment Plan:    Diagnostics/Referrals:     Medications:    NSAIDs:  ibuprofen as needed for pain  Topical Agent: No  TCA/SSRI/SNRI: None  Anti-convulsants:  start gabapentin 600 mg 3 times a day, prescription was provided  Muscle Relaxants: None  Opioids: None    Interventional Therapy: Procedure based on MRI images.  Sedation: NA.  Anticoagulation medications: None -Clearance to stop Blood thinner: NA    Regarding the above interventions, the patient has been educated regarding the risks (including bleeding, infection, increased pain, nerve damage, or allergic reaction), benefits, and alternatives. The patient states she understands and is eager to proceed.    Physical Rehabilitation:  continue with a home exercise    Patient Education: Counseled patient regarding the importance of activity modification, I have stressed the importance of physical activity and a home exercise plan to help with pain and improve health.    Follow-up: RTC  to discuss images.    May consider: Nik Marie MD  Anesthesiologist  Interventional Pain Medicine  05/19/2025    Disclaimer:  This note was prepared using voice recognition system and is likely to have sound alike errors that may have been overlooked even after proof reading.  Please call me with any questions.

## 2025-05-19 NOTE — TELEPHONE ENCOUNTER
----- Message from Mukesh Marie MD sent at 5/19/2025 10:12 AM CDT -----  · Interventional Therapy: Please schedule for bilateral L5 TFESI.· Sedation: Iv sedation · Anticoagulation medications: None -Clearance to stop Blood thinner: NA· 2 weeks PPF

## 2025-05-19 NOTE — H&P (VIEW-ONLY)
EST Patient Evaluation  [facility]    Isidra Oliver  : 1980  Date: 2025     CHIEF COMPLAINT:  Discuss MRI  Referring Physician: Self, Aaareferral  Primary Care Physician: Raz Otto PA-C    HPI:  This is a 44 y.o. female with a chief complaint of Discuss MRI  . The patient has Past medical history/Past surgical history of   MI, obesity, tobacco use disorder, diabetes, migraine, cervical cancer s/p hysterectomy     Patient was evaluated and referred by PCP low back pain and  bilateral lower extremity radiation in addition to neck pain and right upper extremity radiation associated with weak handgrip.    Interval History 2025:  Isidra Oliver is here for follow up visit discuss MRI lumbar spine that showed significant finding at L4-L5 and L5-S1;  and L4-L5left foramina disc protrusion and facet arthropathy contributing to mild bilateral neural foramina narrowing and impingement of the exiting left L4 nerve root, at L5-S1 mild central canal stenosis with impingement upon the bilateral descending S1 nerve root left worse than the right with moderate bilateral neural foramina narrowing.    MRI cervical spine showed minimal posterior disc osteophyte complex with no central or neural foramina narrowing with mild degenerative changes at C6-C7  Current pain score: 10/10    Diabetic: Yes    Anticoagulation medications: None    Allergy To Iodine: No    Currently on Antibiotic: No    Pain Disability Index Review:      2025    10:35 AM   Last 3 PDI Scores   Pain Disability Index (PDI) 42     Current Description of Pain Symptoms:    History of Recent Fall or Trauma: No   Onset: Chronic, started on going  Pain Location: Lower back  and neck pain  Radiates/associated symptoms: BL LE to the feet, neck and RT UE associated with lose of hand strength   Pain is Getting worse over the last 3 months   The pain is continuous.   The pain is described as aching,  burning, numbing, sharp, shooting, stabbing, and tingling.   Exacerbating factors: Sitting, Standing, Laying, Bending, Walking, Night Time, Morning, Lifting, and Getting out of bed/chair.   Mitigating factors N/A.   Symptoms interfere with daily activity, sleeping.   The patient feels like symptoms have been worsening.   Patient denies night fever/night sweats, urinary incontinence, bowel incontinence, significant weight loss, significant motor weakness, and loss of sensations.  Weak Right hand      Pain score:   Best: 7/10  Worst: 10/10    Current pain medication:  Tylenol 500mg, PRN  Ibuprofen 800mg, PRN  Gabapentin 600mg, TID    Current Narcotics/Opioid /benzo Medications:  Opioids- None  Benzodiazepines: No    UDS:  NA    PDMP:  Reviewed and consistent with medication use as prescribed.     Previous Chronic Pain Treatment History:  Six weeks of conservative therapy include: Physical Therapy/HEP/Physician Lead Exercise Program:  Over the past 12 months, Patient has done  3 sessions.  PT response: Not Helpful. Had to stop due to pain.   Dates of the PT sessions: 2024.  Is patient actively participating in home exercise program (HEP)/ physician led exercise program in the last 6 months: Yes.    Non-interventional Pain Therapy:  []Chiropractor.   []Acupuncture/Dry needle.  [x]TENS unit.  [x]Heat/ICE.  []Back Brace.    Medications previously tried:  NSAIDs: OTC and Ibuprofen (Advil/Motrin)  Topical Agent: Yes  TCA/SSRI/SNRI: None  Anti-convulsants: Gabapentin : no relief   Muscle Relaxants: Robaxin (methocarbamol )  and Tizanidine (Zanaflex)  Opioids- Oxycodone with Acetaminophen (Percocet) and Hydrocodone with Acetaminophen (Norco).    Interventional Pain Procedures:  N/A    Previous spine/Relevant joint surgery:  N/A  Surgical History:   has a past surgical history that includes  section; Gallbladder surgery; Tubal ligation; Robot-assisted laparoscopic abdominal hysterectomy using da Bhavna Xi (N/A,  4/26/2023); Robot-assisted laparoscopic salpingo-oophorectomy using da Bhavna Xi (Bilateral, 4/26/2023); mapping, lymph node, sentinel (N/A, 4/26/2023); Lymph node biopsy (N/A, 4/26/2023); and Robot-assisted laparoscopic lysis of adhesions using da Bhavna Xi (N/A, 4/26/2023).  Medical History:   has a past medical history of Asthma, Back pain, and Heart attack (2020).  Family History:  family history is not on file.  Allergies:  Patient has no known allergies.   Social History/SUBSTANCE ABUSE HISTORY:  Personal history of substance abuse: No   reports that she has been smoking cigarettes. She has a 22.5 pack-year smoking history. She does not have any smokeless tobacco history on file. She reports that she does not drink alcohol and does not use drugs.  LABS:  CBC  Lab Results   Component Value Date    WBC 16.22 (H) 05/08/2023    HGB 10.7 (L) 05/08/2023    HCT 33.5 (L) 05/08/2023     Coagulation Profile   Lab Results   Component Value Date     (H) 05/08/2023       Lab Results   Component Value Date    INR 0.9 05/08/2023     CMP:  BMP  Lab Results   Component Value Date     05/06/2023    K 3.8 05/06/2023     05/06/2023    CO2 22 (L) 05/06/2023    BUN 8 05/06/2023    CREATININE 0.8 05/06/2023    CALCIUM 8.7 05/06/2023    ANIONGAP 8 05/06/2023    EGFRNORACEVR >60 05/06/2023     Lab Results   Component Value Date    ALT 31 05/06/2023    AST 19 05/06/2023    ALKPHOS 96 05/06/2023    BILITOT 0.2 05/06/2023     HGBA1C:  Lab Results   Component Value Date    HGBA1C 6.4 (H) 05/05/2023       ROS:    Review of Systems   GENERAL:  No weight loss, malaise or fevers.  HEENT:   No recent changes in vision or hearing  NECK:  Negative for lumps, no difficulty with swallowing.  RESPIRATORY:  Negative for cough, wheezing or shortness of breath, patient denies any recent URI.  CARDIOVASCULAR:  Negative for chest pain or palpitations.  GI:  Negative for abdominal discomfort, blood in stools or black stools or change in  "bowel habits.  MUSCULOSKELETAL:  See HPI.  SKIN:  Negative for lesions, rash, and itching.  PSYCH:  No mood disorder or recent psychosocial stressors.   HEMATOLOGY/LYMPHOLOGY:  See the blood thinner sectioned in HPI.  NEURO:  See HPI  All other reviewed and negative other than HPI.    PHYSICAL EXAM:  VITALS: BP (!) 160/99 (BP Location: Right arm, Patient Position: Sitting)   Pulse 80   Ht 5' 2" (1.575 m)   Wt 109.8 kg (242 lb)   LMP 04/26/2023   SpO2 96%   BMI 44.26 kg/m²   Body mass index is 44.26 kg/m².  GENERAL: Well appearing, in no acute distress, alert and oriented x3, answers questions appropriately.   PSYCH: Flat affect.  SKIN: Skin color, texture, turgor normal, no rashes or lesions.  HEAD/FACE:  Normocephalic, atraumatic. Cranial nerves grossly intact.  CV: Regular rate  PULM: No evidence of respiratory difficulty, symmetric chest rise.  GI:  Soft and non-Distended.    BACK/SIJ/HIP:  Lumbar Spine Exam:       Inspection: No erythema, bruising.       Palpation: (+++) TTP of lumbar paraspinals bilaterally      ROM:  Limited in flexion, extension, lateral bending.       (+++) Facet loading bilateral      (+) Straight Leg Raise, bilateral      (+) AILYN, Tenderness over the PSIS, Yeoman test, bilateral  Hip Exam:      Inspection: No gross deformity or apparent leg length discrepancy      Palpation:  No TTP to bilateral greater trochanteric bursas.       ROM:  no limitation Due to pain in internal rotation, external rotation b/l  Neurologic Exam:     Alert. Speech is fluent and appropriate.     Strength: 5/5 in bilateral hip flexion and knee extension     Sensation:  Grossly intact to light touch in bilateral lower extremities     Tone: No abnormality appreciated in bilateral lower extremities    CERVICAL SPINE AND BILATERAL UPPER EXTREMITY EXAM:   INSPECTION: No gross deformities or abnormalities noted.   RANGE OF MOTION:  limited  STABILITY: No instability noted/appreciated.   MYOFASCIAL EXAM: " +  SPURLING: +  right upper extremity  FACET LOADING:  mild positive  MUSCLE STRENGTH: 4/5  right upper extremity  SENSORY EXAM: Intact to light touch, bilateral upper extremities.   WEBBER'S: Negative bilaterally.     GAIT:  normal gait    DIAGNOSTIC STUDIES AND MEDICAL RECORDS REVIEW:  I have personally reviewed and interpreted relevant radiology reports and reviewed relevant records from other services in the EMR.       -  MRI lumbar spine 05/2025  Study limited by motion artifact.     The incidentally visualized soft tissue structures of the abdomen have a normal appearance.     Vertebral body alignment and heights are maintained.  There is disc desiccation at L4-5 and L5-S1 with disc space narrowing at L5-S1.  Edema like signal in the posterior elements bilaterally at L4 and L5, more so on the left, which can be a source of pain.  The marrow signal is otherwise normal without evidence for a marrow replacement process, infection or tumor.  The conus terminates at L1-2.     At T12-L1, no disc herniation, central canal stenosis or neural foraminal narrowing.     At L1-2, no disc herniation, central canal stenosis or neural foraminal narrowing.     At L2-3, mild facet arthropathy.  No disc herniation, central canal stenosis or neural foraminal narrowing.     At L3-4, mild bilateral facet arthropathy.  No disc herniation, central canal stenosis or neural foraminal narrowing.     At L4-5, minimal disc bulging, most pronounced in the foraminal regions bilaterally with a superimposed left foraminal disc protrusion and facet arthropathy contributing to mild bilateral neural foraminal narrowing.  There is likely some impingement upon the exiting left L4 nerve root.     At L5-S1, there is a circumferential disc bulge with a superimposed central/left paracentral disc protrusion and facet arthropathy.  Findings contribute to mild central canal stenosis with impingement upon the bilateral descending S1 nerve roots, left  greater than right.  There is also moderate bilateral neural foraminal narrowing.     Impression:     Degenerative changes of the lumbar spine, most pronounced at L4-5 and L5-S1 levels contributing to central canal stenosis and neural foraminal narrowing.  Of note there does appear to be some impingement upon the exiting left L4 nerve root as well as the bilateral descending S1 nerve roots, left greater than right.  Please see above report for level by level description.     Prominent edema like signal in the posterior elements bilaterally at L4 and L5, more pronounced on the left, which can be a source of pain.    - MRI cervical spine 05/2025    Motion limited study.     The incidentally visualized soft tissue structures of the neck have a normal appearance.     The craniocervical junction is intact.  There is no evidence for a Chiari malformation.  This the spinal cord is normal in signal without cord edema or myelomalacia.     Straightening of the normal cervical lordosis.  Vertebral body heights are maintained.  Disc desiccation throughout the cervical spine.  The marrow signal is normal without evidence for a marrow replacement process, infection or tumor.     At C2-3, no disc herniation, central canal stenosis or neural foraminal narrowing.     At C3-4, no disc herniation, central canal stenosis or neural foraminal narrowing.     At C4-5, no disc herniation, central canal stenosis or neural foraminal narrowing.     At C5-6, no disc herniation, central canal stenosis or neural foraminal narrowing.     At C6-7, posterior disc osteophyte complex with uncovertebral spurring.  No central canal stenosis or neural foraminal narrowing.     At C7-T1, no disc herniation, central canal stenosis or neural foraminal narrowing.     Impression:     Minimal degenerative change at C6-7.  No central canal stenosis or neural foraminal narrowing.  Clinical Impression:  This is a pleasant 44 y.o. female patient with PMH/PSH of    MI, obesity, tobacco use disorder, diabetes, migraine, cervical cancer s/p hysterectomy , presenting with neck and low back pain with upper and lower extremity radiation:  right upper extremity radiation associated with weak right hand  in addition to right shoulder pain and right chest pain+  bilateral lower extremity radiation to the feet, patient has tried and failed formal physical therapy, she has history of cervical cancer,  MRI lumbar spine showed significant finding at L4-L5 and L5-S1,  I would recommend proceeding with bilateral L5 transforaminal    Encounter Diagnosis:  Isidra Oliver is a 44 y.o. female with the following diagnoses based on history, exam, and imagin. Lumbar herniated disc  - traMADoL (ULTRAM) 50 mg tablet; Take 1 tablet (50 mg total) by mouth every 6 (six) hours.  Dispense: 20 tablet; Refill: 0    2. Lumbar radiculitis    3. Lumbar radiculopathy    4. Lumbar foraminal stenosis    5. Degeneration of intervertebral disc of lumbar region with discogenic back pain and lower extremity pain      Treatment Plan:    Diagnostics/Referrals:  continue with home exercise    Medications:    NSAIDs:  ibuprofen as needed for pain  Topical Agent: No  TCA/SSRI/SNRI: None  Anti-convulsants:   increase gabapentin to 800 mg 3 times a day  Muscle Relaxants: None  Opioids: start tramadol 50 mg TID    Interventional Therapy: Please schedule for bilateral L5 TFESI.  Sedation: Iv sedation   Anticoagulation medications: None -Clearance to stop Blood thinner: NA  2 weeks    Regarding the above interventions, the patient has been educated regarding the risks (including bleeding, infection, increased pain, nerve damage, or allergic reaction), benefits, and alternatives. The patient states she understands and is eager to proceed.    Physical Rehabilitation:  continue with a home exercise    Patient Education: Counseled patient regarding the importance of activity modification, I have stressed the  importance of physical activity and a home exercise plan to help with pain and improve health.    Follow-up: RTC  2 weeks    May consider:  lumbar epidural steroid injection at L5-S1 versus L4-L5    Mukesh Marie MD  Anesthesiologist  Interventional Pain Medicine  05/19/2025    Disclaimer:  This note was prepared using voice recognition system and is likely to have sound alike errors that may have been overlooked even after proof reading.  Please call me with any questions.

## 2025-05-19 NOTE — TELEPHONE ENCOUNTER
----- Message from Nuha sent at 5/19/2025  2:09 PM CDT -----  Contact: [facility]  Isidra BucknerRN: [id number]: 1980PCP: Christian Otto Phone      [phone] Phone      Not on file.Mobile          [phone]          Not on file.MESSAGE: Pharmacy is needing a return call regarding a prescription they received for the patient today.Phone: [phone]

## 2025-05-20 RX ORDER — SODIUM CHLORIDE 9 MG/ML
500 INJECTION, SOLUTION INTRAVENOUS CONTINUOUS
OUTPATIENT
Start: 2025-05-20

## 2025-05-23 ENCOUNTER — HOSPITAL ENCOUNTER (OUTPATIENT)
Dept: RADIOLOGY | Facility: HOSPITAL | Age: 45
Discharge: HOME | End: 2025-05-23
Attending: ANESTHESIOLOGY
Payer: MEDICAID

## 2025-05-23 ENCOUNTER — HOSPITAL ENCOUNTER (OUTPATIENT)
Facility: HOSPITAL | Age: 45
Discharge: HOME | End: 2025-05-23
Attending: ANESTHESIOLOGY | Admitting: ANESTHESIOLOGY
Payer: MEDICAID

## 2025-05-23 VITALS
DIASTOLIC BLOOD PRESSURE: 79 MMHG | RESPIRATION RATE: 14 BRPM | OXYGEN SATURATION: 94 % | TEMPERATURE: 98 F | HEART RATE: 67 BPM | SYSTOLIC BLOOD PRESSURE: 149 MMHG

## 2025-05-23 DIAGNOSIS — M54.16 LUMBAR RADICULOPATHY: Primary | ICD-10-CM

## 2025-05-23 DIAGNOSIS — M54.16 LUMBAR RADICULITIS: ICD-10-CM

## 2025-05-23 DIAGNOSIS — R52 PAIN: ICD-10-CM

## 2025-05-23 LAB
GLUCOSE SERPL-MCNC: 97 MG/DL (ref 70–110)
POCT GLUCOSE: 97 MG/DL (ref 70–110)

## 2025-05-23 PROCEDURE — 76000 FLUOROSCOPY <1 HR PHYS/QHP: CPT | Mod: TC

## 2025-05-23 PROCEDURE — 25500020 PHARM REV CODE 255: Performed by: ANESTHESIOLOGY

## 2025-05-23 PROCEDURE — 64483 NJX AA&/STRD TFRM EPI L/S 1: CPT | Mod: 50 | Performed by: ANESTHESIOLOGY

## 2025-05-23 PROCEDURE — 64483 NJX AA&/STRD TFRM EPI L/S 1: CPT | Mod: 50,,, | Performed by: ANESTHESIOLOGY

## 2025-05-23 PROCEDURE — 82962 GLUCOSE BLOOD TEST: CPT | Performed by: ANESTHESIOLOGY

## 2025-05-23 PROCEDURE — 99152 MOD SED SAME PHYS/QHP 5/>YRS: CPT | Performed by: ANESTHESIOLOGY

## 2025-05-23 PROCEDURE — 63600175 PHARM REV CODE 636 W HCPCS: Performed by: ANESTHESIOLOGY

## 2025-05-23 RX ORDER — MIDAZOLAM HYDROCHLORIDE 1 MG/ML
INJECTION INTRAMUSCULAR; INTRAVENOUS
Status: DISCONTINUED | OUTPATIENT
Start: 2025-05-23 | End: 2025-05-23 | Stop reason: HOSPADM

## 2025-05-23 RX ORDER — DEXAMETHASONE SODIUM PHOSPHATE 10 MG/ML
INJECTION, SOLUTION INTRA-ARTICULAR; INTRALESIONAL; INTRAMUSCULAR; INTRAVENOUS; SOFT TISSUE
Status: DISCONTINUED | OUTPATIENT
Start: 2025-05-23 | End: 2025-05-23 | Stop reason: HOSPADM

## 2025-05-23 RX ORDER — LIDOCAINE HYDROCHLORIDE 10 MG/ML
INJECTION, SOLUTION EPIDURAL; INFILTRATION; INTRACAUDAL; PERINEURAL
Status: DISCONTINUED | OUTPATIENT
Start: 2025-05-23 | End: 2025-05-23 | Stop reason: HOSPADM

## 2025-05-23 RX ORDER — LIDOCAINE HYDROCHLORIDE 20 MG/ML
INJECTION, SOLUTION INFILTRATION; PERINEURAL
Status: DISCONTINUED | OUTPATIENT
Start: 2025-05-23 | End: 2025-05-23 | Stop reason: HOSPADM

## 2025-05-23 RX ORDER — FENTANYL CITRATE 50 UG/ML
INJECTION, SOLUTION INTRAMUSCULAR; INTRAVENOUS
Status: DISCONTINUED | OUTPATIENT
Start: 2025-05-23 | End: 2025-05-23 | Stop reason: HOSPADM

## 2025-05-23 NOTE — DISCHARGE INSTRUCTIONS
DIET: You may resume your normal diet today.    BATHING: You may resume your normal bathing.          You may shower, no hot water directly on site for 24 hours.    DRESSING: You may remove your bandage today.    ACTIVITY LEVEL: You may resume your normal activities 24 hours after your  procedure.    If you have received sedation or an anesthetic, you may feel sleepy for several hours. Rest until you are more awake. Gradually resume your normal activities tomorrow.    If you have received sedation or an anesthetic, do not drive or operate heavy machinery for at least 24 hours.    MEDICATION: You may resume your normal medications today.    You will receive instructions for any pain prescriptions. Pain medications should be taken only as directed.    SPECIAL INSTRUCTIONS: No heat to the injection site for 24 hours including: bath or shower, heating pad, moist heat, hot tubs.    Use ice pack to injection site for any pain or discomfort. Apply ice pack to 20 minutes then remove for 20 minutes before re-applying to site.    WHEN TO CALL DOCTOR: Redness or swelling around injection site    Fever of 101F    Drainage (pus) from the injection site    For any continuous bleeding (some dried blood over the incision is normal).    FOLLOW UP: Follow up phone call will be made by office.    FOR EMERGENCIES: If any unusual problems or difficulties occur during clinic hours, call [phone] or [phone].

## 2025-05-23 NOTE — OP NOTE
Lumbar Transforaminal Epidural Steroid Injection under Fluoroscopic Guidance    The procedure, risks, benefits, and options were discussed with the patient. There are no contraindications to the procedure. The patent expressed understanding and agreed to the procedure. Informed written consent was obtained prior to the start of the procedure and can be found in the patient's chart.    PATIENT NAME: Isidra Oliver   MRN: [id number]     DATE OF PROCEDURE: 05/23/2025    PROCEDURE:  Bilateral  L5/S1 Lumbar Transforaminal Epidural Steroid Injection under Fluoroscopic Guidance    PRE-OP DIAGNOSIS: Lumbar radiculitis [M54.16] Lumbar radiculopathy [M54.16]    POST-OP DIAGNOSIS: Same    PHYSICIAN: Mukesh Marie MD      MEDICATIONS INJECTED: Preservative-free Decadron 10mg with 5cc of Lidocaine 1% MPF     LOCAL ANESTHETIC INJECTED: Xylocaine 2%     SEDATION: Versed 3mg and Fentanyl 150                                                                                                                                                                                     Conscious sedation ordered by M.D. Patient re-evaluation prior to administration of conscious sedation. No changes noted in patient's status from initial evaluation. The patient's vital signs were monitored by RN and patient remained hemodynamically stable throughout the procedure.  No case tracking events are documented in the log.    ESTIMATED BLOOD LOSS: None    COMPLICATIONS: None    TECHNIQUE: Time-out was performed to identify the patient and procedure to be performed. With the patient laying in a prone position, the surgical area was prepped and draped in the usual sterile fashion using ChloraPrep and a fenestrated drape.The levels were determined under fluoroscopy guidance. Skin anesthesia was achieved by injecting Lidocaine 2% over the injection sites. The transforaminal spaces were then approached with a 22 gauge, 5 inch spinal quinke needle that was  introduced under fluoroscopic guidance in the AP and Lateral views. Once the needle tip was in the area of the transforaminal space, and there was no blood, CSF or paraesthesias, contrast dye Omnipaque (300mg/mL) was injected to confirm placement and there was no vascular runoff. Fluoroscopic imaging in the AP and lateral views revealed a clear outline of the spinal nerve with proximal spread of agent through the neural foramen into the epidural space. 2 mL of the medication mixture listed above was injected slowly at each site. Displacement of the radio opaque contrast after injection of the medication confirmed that the medication went into the area of the transforaminal spaces. The needles were removed and bleeding was nil. A sterile dressing was applied. No specimens collected. The patient tolerated the procedure well.     The patient was monitored after the procedure in the recovery area. They were given post-procedure and discharge instructions to follow at home. The patient was discharged in a stable condition.    Mukesh Marie MD

## 2025-05-23 NOTE — DISCHARGE SUMMARY
Discharge Note  Short Stay    Admit Date: 5/23/2025    Attending Physician: Mukesh Marie    Discharge Physician: Mukesh Marie    Discharge Date: 5/23/2025 8:30 AM    Procedure(s) (LRB):  TRANSFORAMINAL EPIDURAL STEROID INJECTION (L5) (Bilateral)    Final Diagnosis: Lumbar radiculitis [M54.16]    Disposition: Home or self care    Patient Instructions:   Current Discharge Medication List        CONTINUE these medications which have NOT CHANGED    Details   traMADoL (ULTRAM) 50 mg tablet Take 1 tablet (50 mg total) by mouth every 6 (six) hours.  Qty: 20 tablet, Refills: 0    Comments: Quantity prescribed more than 7 day supply? No  Associated Diagnoses: Lumbar herniated disc      acetaminophen (TYLENOL) 500 MG tablet Take 1 tablet (500 mg total) by mouth every 6 (six) hours as needed for Pain. Take alternating with ibuprofen hours scheduled for the 1st week after surgery  Qty: 60 tablet, Refills: 1      albuterol (PROVENTIL) 2.5 mg /3 mL (0.083 %) nebulizer solution albuterol sulfate 2.5 mg/3 mL (0.083 %) solution for nebulization      albuterol (PROVENTIL/VENTOLIN HFA) 90 mcg/actuation inhaler albuterol sulfate HFA 90 mcg/actuation aerosol inhaler      benzphetamine 50 mg Tab Take one tablet by mouth three times a day as needed  Qty: 90 each, Refills: 0      diethylpropion 25 mg Tab Take 1 tablet by mouth three times daily as needed.  Qty: 90 each, Refills: 0      gabapentin (NEURONTIN) 800 MG tablet Take 1 tablet (800 mg total) by mouth 3 (three) times daily.  Qty: 90 tablet, Refills: 11      !! ibuprofen (ADVIL,MOTRIN) 600 MG tablet Take 1 tablet (600 mg total) by mouth every 6 (six) hours as needed for Pain. Take scheduled alternating with tylenol every 6 hours for the first week after surgery  Qty: 60 tablet, Refills: 1      !! ibuprofen (ADVIL,MOTRIN) 800 MG tablet Take 800 mg by mouth 3 (three) times daily.      oxyCODONE-acetaminophen (PERCOCET) 5-325 mg per tablet Take 1 tablet by mouth every 4 (four) hours  as needed for Pain.  Qty: 10 each, Refills: 0    Comments: Quantity prescribed more than 7 day supply? No      OZEMPIC 1 mg/dose (4 mg/3 mL) Inject 1 mg into the skin every 7 days.      SENNA 8.6 mg tablet Take 1 tablet by mouth 2 (two) times daily.  Qty: 60 tablet, Refills: 1       !! - Potential duplicate medications found. Please discuss with provider.          Discharge Diagnosis: Lumbar radiculitis [M54.16]  Condition on Discharge: Stable with no complications to procedure   Diet on Discharge: Same as before.  Activity: as per instruction sheet.  Discharge to: Home with a responsible adult.  Follow up: 2-4 weeks       Please call my office or pager at [phone] if experienced any weakness or loss of sensation, fever > 101.5, pain uncontrolled with oral medications, persistent nausea/vomiting/or diarrhea, redness or drainage from the incisions, or any other worrisome concerns. If physician on call was not reached or could not communicate with our office for any reason please go to the nearest emergency department.     Mukesh Marie  05/23/2025

## 2025-05-27 ENCOUNTER — TELEPHONE (OUTPATIENT)
Dept: PAIN MEDICINE | Facility: CLINIC | Age: 45
End: 2025-05-27
Payer: MEDICAID

## 2025-05-27 NOTE — TELEPHONE ENCOUNTER
----- Message from Mukesh Marie MD sent at 5/27/2025  7:44 AM CDT -----   Postprocedure follow up visit please

## 2025-07-03 ENCOUNTER — OFFICE VISIT (OUTPATIENT)
Dept: PAIN MEDICINE | Facility: CLINIC | Age: 45
End: 2025-07-03
Payer: MEDICAID

## 2025-07-03 VITALS
DIASTOLIC BLOOD PRESSURE: 78 MMHG | HEART RATE: 72 BPM | SYSTOLIC BLOOD PRESSURE: 110 MMHG | WEIGHT: 247 LBS | HEIGHT: 62 IN | OXYGEN SATURATION: 98 % | BODY MASS INDEX: 45.45 KG/M2

## 2025-07-03 DIAGNOSIS — M48.061 LUMBAR FORAMINAL STENOSIS: ICD-10-CM

## 2025-07-03 DIAGNOSIS — M51.26 LUMBAR HERNIATED DISC: ICD-10-CM

## 2025-07-03 DIAGNOSIS — M51.362 DEGENERATION OF INTERVERTEBRAL DISC OF LUMBAR REGION WITH DISCOGENIC BACK PAIN AND LOWER EXTREMITY PAIN: ICD-10-CM

## 2025-07-03 DIAGNOSIS — M54.16 LUMBAR RADICULITIS: Primary | ICD-10-CM

## 2025-07-03 PROCEDURE — 99214 OFFICE O/P EST MOD 30 MIN: CPT | Mod: PBBFAC | Performed by: ANESTHESIOLOGY

## 2025-07-03 PROCEDURE — 99999 PR PBB SHADOW E&M-EST. PATIENT-LVL IV: CPT | Mod: PBBFAC,,, | Performed by: ANESTHESIOLOGY

## 2025-07-03 NOTE — PROGRESS NOTES
EST Patient Evaluation  [facility]    Isidra Oliver  : 1980  Date: 7/3/2025     CHIEF COMPLAINT:  No chief complaint on file.  Referring Physician: No ref. provider found  Primary Care Physician: Raz Otto PA-C    HPI:  This is a 44 y.o. female with a chief complaint of No chief complaint on file.  . The patient has Past medical history/Past surgical history of   MI, obesity, tobacco use disorder, diabetes, migraine, cervical cancer s/p hysterectomy     Patient was evaluated and referred by PCP low back pain and  bilateral lower extremity radiation in addition to neck pain and right upper extremity radiation associated with weak handgrip.    Interval History 2025:  Isidra Oliver is here for follow up visit discuss MRI lumbar spine that showed significant finding at L4-L5 and L5-S1;  and L4-L5left foramina disc protrusion and facet arthropathy contributing to mild bilateral neural foramina narrowing and impingement of the exiting left L4 nerve root, at L5-S1 mild central canal stenosis with impingement upon the bilateral descending S1 nerve root left worse than the right with moderate bilateral neural foramina narrowing.    MRI cervical spine showed minimal posterior disc osteophyte complex with no central or neural foramina narrowing with mild degenerative changes at C6-C7  Current pain score: 10/10    Interval History 2025:  Isidra Oliver is here for procedure follow up visit after bilateral L5 transforaminal epidural steroid injection,  patient reported *** % improvement in pain and functionality.   Also patient has a recommendation to increase her gabapentin to 800 mg 3 times a day, patient reported  Current pain score: ***/10    Diabetic: Yes    Anticoagulation medications: None    Allergy To Iodine: No    Currently on Antibiotic: No    Pain Disability Index Review:      2025    10:35 AM   Last 3 PDI Scores   Pain Disability  Index (PDI) 42     Current Description of Pain Symptoms:    History of Recent Fall or Trauma: No   Onset: Chronic, started on going  Pain Location: Lower back  and neck pain  Radiates/associated symptoms: BL LE to the feet, neck and RT UE associated with lose of hand strength   Pain is Getting worse over the last 3 months   The pain is continuous.   The pain is described as aching, burning, numbing, sharp, shooting, stabbing, and tingling.   Exacerbating factors: Sitting, Standing, Laying, Bending, Walking, Night Time, Morning, Lifting, and Getting out of bed/chair.   Mitigating factors N/A.   Symptoms interfere with daily activity, sleeping.   The patient feels like symptoms have been worsening.   Patient denies night fever/night sweats, urinary incontinence, bowel incontinence, significant weight loss, significant motor weakness, and loss of sensations.  Weak Right hand      Pain score:   Best: 7/10  Worst: 10/10    Current pain medication:  Tylenol 500mg, PRN  Ibuprofen 800mg, PRN  Gabapentin 800mg, TID    Current Narcotics/Opioid /benzo Medications:  Opioids- None  Benzodiazepines: No    UDS:  NA    PDMP:  Reviewed and consistent with medication use as prescribed.     Previous Chronic Pain Treatment History:  Six weeks of conservative therapy include: Physical Therapy/HEP/Physician Lead Exercise Program:  Over the past 12 months, Patient has done  3 sessions.  PT response: Not Helpful. Had to stop due to pain.   Dates of the PT sessions: 11/2024.  Is patient actively participating in home exercise program (HEP)/ physician led exercise program in the last 6 months: Yes.    Non-interventional Pain Therapy:  []Chiropractor.   []Acupuncture/Dry needle.  [x]TENS unit.  [x]Heat/ICE.  []Back Brace.    Medications previously tried:  NSAIDs: OTC and Ibuprofen (Advil/Motrin)  Topical Agent: Yes  TCA/SSRI/SNRI: None  Anti-convulsants: Gabapentin : no relief   Muscle Relaxants: Robaxin (methocarbamol )  and Tizanidine  (Zanaflex)  Opioids- Oxycodone with Acetaminophen (Percocet) and Hydrocodone with Acetaminophen (Norco).    Interventional Pain Procedures:  ***    Previous spine/Relevant joint surgery:  N/A  Surgical History:   has a past surgical history that includes  section; Gallbladder surgery; Tubal ligation; Robot-assisted laparoscopic abdominal hysterectomy using da Bhavna Xi (N/A, 2023); Robot-assisted laparoscopic salpingo-oophorectomy using da Bhavna Xi (Bilateral, 2023); mapping, lymph node, sentinel (N/A, 2023); Lymph node biopsy (N/A, 2023); Robot-assisted laparoscopic lysis of adhesions using da Bhavna Xi (N/A, 2023); and Selective injection of anesthetic agent around lumbar spinal nerve root by transforaminal approach (Bilateral, 2025).  Medical History:   has a past medical history of Asthma, Back pain, and Heart attack ().  Family History:  family history is not on file.  Allergies:  Patient has no known allergies.   Social History/SUBSTANCE ABUSE HISTORY:  Personal history of substance abuse: No   reports that she has been smoking cigarettes. She has a 22.5 pack-year smoking history. She does not have any smokeless tobacco history on file. She reports that she does not drink alcohol and does not use drugs.  LABS:  CBC  Lab Results   Component Value Date    WBC 16.22 (H) 2023    HGB 10.7 (L) 2023    HCT 33.5 (L) 2023     Coagulation Profile   Lab Results   Component Value Date     (H) 2023       Lab Results   Component Value Date    INR 0.9 2023     CMP:  BMP  Lab Results   Component Value Date     2023    K 3.8 2023     2023    CO2 22 (L) 2023    BUN 8 2023    CREATININE 0.8 2023    CALCIUM 8.7 2023    ANIONGAP 8 2023    EGFRNORACEVR >60 2023     Lab Results   Component Value Date    ALT 31 2023    AST 19 2023    ALKPHOS 96 2023    BILITOT 0.2 2023      HGBA1C:  Lab Results   Component Value Date    HGBA1C 6.4 (H) 05/05/2023       ROS:    Review of Systems   GENERAL:  No weight loss, malaise or fevers.  HEENT:   No recent changes in vision or hearing  NECK:  Negative for lumps, no difficulty with swallowing.  RESPIRATORY:  Negative for cough, wheezing or shortness of breath, patient denies any recent URI.  CARDIOVASCULAR:  Negative for chest pain or palpitations.  GI:  Negative for abdominal discomfort, blood in stools or black stools or change in bowel habits.  MUSCULOSKELETAL:  See HPI.  SKIN:  Negative for lesions, rash, and itching.  PSYCH:  No mood disorder or recent psychosocial stressors.   HEMATOLOGY/LYMPHOLOGY:  See the blood thinner sectioned in HPI.  NEURO:  See HPI  All other reviewed and negative other than HPI.    PHYSICAL EXAM:  VITALS: LMP 04/26/2023   There is no height or weight on file to calculate BMI.  GENERAL: Well appearing, in no acute distress, alert and oriented x3, answers questions appropriately.   PSYCH: Flat affect.  SKIN: Skin color, texture, turgor normal, no rashes or lesions.  HEAD/FACE:  Normocephalic, atraumatic. Cranial nerves grossly intact.  CV: Regular rate  PULM: No evidence of respiratory difficulty, symmetric chest rise.  GI:  Soft and non-Distended.    BACK/SIJ/HIP:  Lumbar Spine Exam:       Inspection: No erythema, bruising.       Palpation: (+++) TTP of lumbar paraspinals bilaterally      ROM:  Limited in flexion, extension, lateral bending.       (+++) Facet loading bilateral      (+) Straight Leg Raise, bilateral      (+) AILYN, Tenderness over the PSIS, Yeoman test, bilateral  Hip Exam:      Inspection: No gross deformity or apparent leg length discrepancy      Palpation:  No TTP to bilateral greater trochanteric bursas.       ROM:  no limitation Due to pain in internal rotation, external rotation b/l  Neurologic Exam:     Alert. Speech is fluent and appropriate.     Strength: 5/5 in bilateral hip flexion and  knee extension     Sensation:  Grossly intact to light touch in bilateral lower extremities     Tone: No abnormality appreciated in bilateral lower extremities    CERVICAL SPINE AND BILATERAL UPPER EXTREMITY EXAM:   INSPECTION: No gross deformities or abnormalities noted.   RANGE OF MOTION:  limited  STABILITY: No instability noted/appreciated.   MYOFASCIAL EXAM: +  SPURLING: +  right upper extremity  FACET LOADING:  mild positive  MUSCLE STRENGTH: 4/5  right upper extremity  SENSORY EXAM: Intact to light touch, bilateral upper extremities.   WEBBER'S: Negative bilaterally.     GAIT:  normal gait    DIAGNOSTIC STUDIES AND MEDICAL RECORDS REVIEW:  I have personally reviewed and interpreted relevant radiology reports and reviewed relevant records from other services in the EMR.       -  MRI lumbar spine 05/2025  Study limited by motion artifact.     The incidentally visualized soft tissue structures of the abdomen have a normal appearance.     Vertebral body alignment and heights are maintained.  There is disc desiccation at L4-5 and L5-S1 with disc space narrowing at L5-S1.  Edema like signal in the posterior elements bilaterally at L4 and L5, more so on the left, which can be a source of pain.  The marrow signal is otherwise normal without evidence for a marrow replacement process, infection or tumor.  The conus terminates at L1-2.     At T12-L1, no disc herniation, central canal stenosis or neural foraminal narrowing.     At L1-2, no disc herniation, central canal stenosis or neural foraminal narrowing.     At L2-3, mild facet arthropathy.  No disc herniation, central canal stenosis or neural foraminal narrowing.     At L3-4, mild bilateral facet arthropathy.  No disc herniation, central canal stenosis or neural foraminal narrowing.     At L4-5, minimal disc bulging, most pronounced in the foraminal regions bilaterally with a superimposed left foraminal disc protrusion and facet arthropathy contributing to mild  bilateral neural foraminal narrowing.  There is likely some impingement upon the exiting left L4 nerve root.     At L5-S1, there is a circumferential disc bulge with a superimposed central/left paracentral disc protrusion and facet arthropathy.  Findings contribute to mild central canal stenosis with impingement upon the bilateral descending S1 nerve roots, left greater than right.  There is also moderate bilateral neural foraminal narrowing.     Impression:     Degenerative changes of the lumbar spine, most pronounced at L4-5 and L5-S1 levels contributing to central canal stenosis and neural foraminal narrowing.  Of note there does appear to be some impingement upon the exiting left L4 nerve root as well as the bilateral descending S1 nerve roots, left greater than right.  Please see above report for level by level description.     Prominent edema like signal in the posterior elements bilaterally at L4 and L5, more pronounced on the left, which can be a source of pain.    - MRI cervical spine 05/2025    Motion limited study.     The incidentally visualized soft tissue structures of the neck have a normal appearance.     The craniocervical junction is intact.  There is no evidence for a Chiari malformation.  This the spinal cord is normal in signal without cord edema or myelomalacia.     Straightening of the normal cervical lordosis.  Vertebral body heights are maintained.  Disc desiccation throughout the cervical spine.  The marrow signal is normal without evidence for a marrow replacement process, infection or tumor.     At C2-3, no disc herniation, central canal stenosis or neural foraminal narrowing.     At C3-4, no disc herniation, central canal stenosis or neural foraminal narrowing.     At C4-5, no disc herniation, central canal stenosis or neural foraminal narrowing.     At C5-6, no disc herniation, central canal stenosis or neural foraminal narrowing.     At C6-7, posterior disc osteophyte complex with  uncovertebral spurring.  No central canal stenosis or neural foraminal narrowing.     At C7-T1, no disc herniation, central canal stenosis or neural foraminal narrowing.     Impression:     Minimal degenerative change at C6-7.  No central canal stenosis or neural foraminal narrowing.  Clinical Impression:  This is a pleasant 44 y.o. female patient with PMH/PSH of   MI, obesity, tobacco use disorder, diabetes, migraine, cervical cancer s/p hysterectomy , presenting with neck and low back pain with upper and lower extremity radiation:  right upper extremity radiation associated with weak right hand  in addition to right shoulder pain and right chest pain+  bilateral lower extremity radiation to the feet, patient has tried and failed formal physical therapy, she has history of cervical cancer,  MRI lumbar spine showed significant finding at L4-L5 and L5-S1,  patient had bilateral L5 transforaminal epidural steroid injection    Encounter Diagnosis:  Isidra Oliver is a 44 y.o. female with the following diagnoses based on history, exam, and imaging:  There are no diagnoses linked to this encounter.      Treatment Plan:    Diagnostics/Referrals:  continue with home exercise    Medications:    NSAIDs:  ibuprofen as needed for pain  Topical Agent: No  TCA/SSRI/SNRI: None  Anti-convulsants:   increase gabapentin to 800 mg 3 times a day  Muscle Relaxants: None  Opioids: start tramadol 50 mg TID    Interventional Therapy: Please schedule for bilateral L5 TFESI.  Sedation: Iv sedation   Anticoagulation medications: None -Clearance to stop Blood thinner: NA  2 weeks    Regarding the above interventions, the patient has been educated regarding the risks (including bleeding, infection, increased pain, nerve damage, or allergic reaction), benefits, and alternatives. The patient states she understands and is eager to proceed.    Physical Rehabilitation:  continue with a home exercise    Patient Education: Counseled  patient regarding the importance of activity modification, I have stressed the importance of physical activity and a home exercise plan to help with pain and improve health.    Follow-up: RTC  2 weeks    May consider:  lumbar epidural steroid injection at L5-S1 versus L4-L5    Mukesh Marie MD  Anesthesiologist  Interventional Pain Medicine  07/03/2025    Disclaimer:  This note was prepared using voice recognition system and is likely to have sound alike errors that may have been overlooked even after proof reading.  Please call me with any questions.

## 2025-07-03 NOTE — PROGRESS NOTES
EST Patient Evaluation  [facility]    Isidra Oliver  : 1980  Date: 7/3/2025     CHIEF COMPLAINT:  Follow-up and Low-back Pain  Referring Physician: No ref. provider found  Primary Care Physician: Raz Otto PA-C    HPI:  This is a 44 y.o. female with a chief complaint of Follow-up and Low-back Pain  . The patient has Past medical history/Past surgical history of   MI, obesity, tobacco use disorder, diabetes, migraine, cervical cancer s/p hysterectomy     Patient was evaluated and referred by PCP low back pain and  bilateral lower extremity radiation in addition to neck pain and right upper extremity radiation associated with weak handgrip.    Interval History 2025:  Isidra Oliver is here for follow up visit discuss MRI lumbar spine that showed significant finding at L4-L5 and L5-S1;  and L4-L5left foramina disc protrusion and facet arthropathy contributing to mild bilateral neural foramina narrowing and impingement of the exiting left L4 nerve root, at L5-S1 mild central canal stenosis with impingement upon the bilateral descending S1 nerve root left worse than the right with moderate bilateral neural foramina narrowing.    MRI cervical spine showed minimal posterior disc osteophyte complex with no central or neural foramina narrowing with mild degenerative changes at C6-C7  Current pain score: 10/10    Interval History 2025:  Isidra Oliver is here for procedure follow up visit after bilateral L5 transforaminal epidural steroid injection,  patient reported 0 % improvement in pain and functionality.   Also patient has a recommendation to increase her gabapentin to 800 mg 3 times a day, patient reported mild to moderate relief.  Current pain score: 9/10    Diabetic: Yes    Anticoagulation medications: None    Allergy To Iodine: No    Currently on Antibiotic: No    Pain Disability Index Review:      2025    10:35 AM   Last 3 PDI Scores    Pain Disability Index (PDI) 42     Current Description of Pain Symptoms:    History of Recent Fall or Trauma: No   Onset: Chronic, started on going  Pain Location: Lower back  and neck pain  Radiates/associated symptoms: BL LEs to the feet, Rt worse than left, neck and RT UE associated with lose of hand strength   Pain is Getting worse over the last 3 months   The pain is continuous.   The pain is described as aching, burning, numbing, sharp, shooting, stabbing, and tingling.   Exacerbating factors: Sitting, Standing, Laying, Bending, Walking, Night Time, Morning, Lifting, and Getting out of bed/chair.   Mitigating factors N/A.   Symptoms interfere with daily activity, sleeping.   The patient feels like symptoms have been worsening.   Patient denies night fever/night sweats, urinary incontinence, bowel incontinence, significant weight loss, significant motor weakness, and loss of sensations.  Weak Right hand      Pain score:   Best: 7/10  Worst: 10/10    Current pain medication:  Tylenol 500mg, PRN  Ibuprofen 800mg, PRN  Gabapentin 800mg, TID    Current Narcotics/Opioid /benzo Medications:  Opioids- None  Benzodiazepines: No    UDS:  NA    PDMP:  Reviewed and consistent with medication use as prescribed.     Previous Chronic Pain Treatment History:  Six weeks of conservative therapy include: Physical Therapy/HEP/Physician Lead Exercise Program:  Over the past 12 months, Patient has done  3 sessions.  PT response: Not Helpful. Had to stop due to pain.   Dates of the PT sessions: 11/2024.  Is patient actively participating in home exercise program (HEP)/ physician led exercise program in the last 6 months: Yes.    Non-interventional Pain Therapy:  []Chiropractor.   []Acupuncture/Dry needle.  [x]TENS unit.  [x]Heat/ICE.  []Back Brace.    Medications previously tried:  NSAIDs: OTC and Ibuprofen (Advil/Motrin)  Topical Agent: Yes  TCA/SSRI/SNRI: None  Anti-convulsants: Gabapentin : no relief   Muscle Relaxants:  Robaxin (methocarbamol )  and Tizanidine (Zanaflex)  Opioids- Oxycodone with Acetaminophen (Percocet) and Hydrocodone with Acetaminophen (Norco).    Interventional Pain Procedures:  2025:  Bilateral  L5/S1 Lumbar Transforaminal Epidural Steroid Injection - 0% relief    Previous spine/Relevant joint surgery:  N/A  Surgical History:   has a past surgical history that includes  section; Gallbladder surgery; Tubal ligation; Robot-assisted laparoscopic abdominal hysterectomy using da Bhavna Xi (N/A, 2023); Robot-assisted laparoscopic salpingo-oophorectomy using da Bhavna Xi (Bilateral, 2023); mapping, lymph node, sentinel (N/A, 2023); Lymph node biopsy (N/A, 2023); Robot-assisted laparoscopic lysis of adhesions using da Bhavna Xi (N/A, 2023); and Selective injection of anesthetic agent around lumbar spinal nerve root by transforaminal approach (Bilateral, 2025).  Medical History:   has a past medical history of Asthma, Back pain, and Heart attack ().  Family History:  family history is not on file.  Allergies:  Patient has no known allergies.   Social History/SUBSTANCE ABUSE HISTORY:  Personal history of substance abuse: No   reports that she has been smoking cigarettes. She has a 22.5 pack-year smoking history. She does not have any smokeless tobacco history on file. She reports that she does not drink alcohol and does not use drugs.  LABS:  CBC  Lab Results   Component Value Date    WBC 16.22 (H) 2023    HGB 10.7 (L) 2023    HCT 33.5 (L) 2023     Coagulation Profile   Lab Results   Component Value Date     (H) 2023       Lab Results   Component Value Date    INR 0.9 2023     CMP:  BMP  Lab Results   Component Value Date     2023    K 3.8 2023     2023    CO2 22 (L) 2023    BUN 8 2023    CREATININE 0.8 2023    CALCIUM 8.7 2023    ANIONGAP 8 2023    EGFRNORACEVR >60 2023  "    Lab Results   Component Value Date    ALT 31 05/06/2023    AST 19 05/06/2023    ALKPHOS 96 05/06/2023    BILITOT 0.2 05/06/2023     HGBA1C:  Lab Results   Component Value Date    HGBA1C 6.4 (H) 05/05/2023       ROS:    Review of Systems   GENERAL:  No weight loss, malaise or fevers.  HEENT:   No recent changes in vision or hearing  NECK:  Negative for lumps, no difficulty with swallowing.  RESPIRATORY:  Negative for cough, wheezing or shortness of breath, patient denies any recent URI.  CARDIOVASCULAR:  Negative for chest pain or palpitations.  GI:  Negative for abdominal discomfort, blood in stools or black stools or change in bowel habits.  MUSCULOSKELETAL:  See HPI.  SKIN:  Negative for lesions, rash, and itching.  PSYCH:  No mood disorder or recent psychosocial stressors.   HEMATOLOGY/LYMPHOLOGY:  See the blood thinner sectioned in HPI.  NEURO:  See HPI  All other reviewed and negative other than HPI.    PHYSICAL EXAM:  VITALS: /78 (BP Location: Left arm, Patient Position: Sitting)   Pulse 72   Ht 5' 2" (1.575 m)   Wt 112 kg (247 lb)   LMP 04/26/2023   SpO2 98%   BMI 45.18 kg/m²   Body mass index is 45.18 kg/m².  GENERAL: Well appearing, in no acute distress, alert and oriented x3, answers questions appropriately.   PSYCH: Flat affect.  SKIN: Skin color, texture, turgor normal, no rashes or lesions.  HEAD/FACE:  Normocephalic, atraumatic. Cranial nerves grossly intact.  CV: Regular rate  PULM: No evidence of respiratory difficulty, symmetric chest rise.  GI:  Soft and non-Distended.    BACK/SIJ/HIP:  Lumbar Spine Exam:       Inspection: No erythema, bruising.       Palpation: (+++) TTP of lumbar paraspinals bilaterally      ROM:  Limited in flexion, extension, lateral bending.       (+++) Facet loading bilateral      (+) Straight Leg Raise, bilateral      (+) AILYN, Tenderness over the PSIS, Yeoman test, bilateral  Hip Exam:      Inspection: No gross deformity or apparent leg length discrepancy   "    Palpation:  No TTP to bilateral greater trochanteric bursas.       ROM:  no limitation Due to pain in internal rotation, external rotation b/l  Neurologic Exam:     Alert. Speech is fluent and appropriate.     Strength: 5/5 in bilateral hip flexion and knee extension     Sensation:  Grossly intact to light touch in bilateral lower extremities     Tone: No abnormality appreciated in bilateral lower extremities    CERVICAL SPINE AND BILATERAL UPPER EXTREMITY EXAM:   INSPECTION: No gross deformities or abnormalities noted.   RANGE OF MOTION:  limited  STABILITY: No instability noted/appreciated.   MYOFASCIAL EXAM: +  SPURLING: +  right upper extremity  FACET LOADING:  mild positive  MUSCLE STRENGTH: 4/5  right upper extremity  SENSORY EXAM: Intact to light touch, bilateral upper extremities.   WEBBER'S: Negative bilaterally.     GAIT:  normal gait    DIAGNOSTIC STUDIES AND MEDICAL RECORDS REVIEW:  I have personally reviewed and interpreted relevant radiology reports and reviewed relevant records from other services in the EMR.       -  MRI lumbar spine 05/2025  Study limited by motion artifact.     The incidentally visualized soft tissue structures of the abdomen have a normal appearance.     Vertebral body alignment and heights are maintained.  There is disc desiccation at L4-5 and L5-S1 with disc space narrowing at L5-S1.  Edema like signal in the posterior elements bilaterally at L4 and L5, more so on the left, which can be a source of pain.  The marrow signal is otherwise normal without evidence for a marrow replacement process, infection or tumor.  The conus terminates at L1-2.     At T12-L1, no disc herniation, central canal stenosis or neural foraminal narrowing.     At L1-2, no disc herniation, central canal stenosis or neural foraminal narrowing.     At L2-3, mild facet arthropathy.  No disc herniation, central canal stenosis or neural foraminal narrowing.     At L3-4, mild bilateral facet arthropathy.  No  disc herniation, central canal stenosis or neural foraminal narrowing.     At L4-5, minimal disc bulging, most pronounced in the foraminal regions bilaterally with a superimposed left foraminal disc protrusion and facet arthropathy contributing to mild bilateral neural foraminal narrowing.  There is likely some impingement upon the exiting left L4 nerve root.     At L5-S1, there is a circumferential disc bulge with a superimposed central/left paracentral disc protrusion and facet arthropathy.  Findings contribute to mild central canal stenosis with impingement upon the bilateral descending S1 nerve roots, left greater than right.  There is also moderate bilateral neural foraminal narrowing.     Impression:     Degenerative changes of the lumbar spine, most pronounced at L4-5 and L5-S1 levels contributing to central canal stenosis and neural foraminal narrowing.  Of note there does appear to be some impingement upon the exiting left L4 nerve root as well as the bilateral descending S1 nerve roots, left greater than right.  Please see above report for level by level description.     Prominent edema like signal in the posterior elements bilaterally at L4 and L5, more pronounced on the left, which can be a source of pain.    - MRI cervical spine 05/2025    Motion limited study.     The incidentally visualized soft tissue structures of the neck have a normal appearance.     The craniocervical junction is intact.  There is no evidence for a Chiari malformation.  This the spinal cord is normal in signal without cord edema or myelomalacia.     Straightening of the normal cervical lordosis.  Vertebral body heights are maintained.  Disc desiccation throughout the cervical spine.  The marrow signal is normal without evidence for a marrow replacement process, infection or tumor.     At C2-3, no disc herniation, central canal stenosis or neural foraminal narrowing.     At C3-4, no disc herniation, central canal stenosis or  neural foraminal narrowing.     At C4-5, no disc herniation, central canal stenosis or neural foraminal narrowing.     At C5-6, no disc herniation, central canal stenosis or neural foraminal narrowing.     At C6-7, posterior disc osteophyte complex with uncovertebral spurring.  No central canal stenosis or neural foraminal narrowing.     At C7-T1, no disc herniation, central canal stenosis or neural foraminal narrowing.     Impression:     Minimal degenerative change at C6-7.  No central canal stenosis or neural foraminal narrowing.  Clinical Impression:  This is a pleasant 44 y.o. female patient with PMH/PSH of   MI, obesity, tobacco use disorder, diabetes, migraine, cervical cancer s/p hysterectomy , presenting with neck and low back pain with upper and lower extremity radiation:  right upper extremity radiation associated with weak right hand  in addition to right shoulder pain and right chest pain+  bilateral lower extremity radiation to the feet, patient has tried and failed formal physical therapy, she has history of cervical cancer,  MRI lumbar spine showed significant finding at L4-L5 and L5-S1,  patient had bilateral L5 transforaminal epidural steroid injection which provided her with no relief,  I would recommend neurosurgery evaluation to discuss surgical options  Encounter Diagnosis:  Isidra Oliver is a 44 y.o. female with the following diagnoses based on history, exam, and imagin. Lumbar radiculitis  - Ambulatory referral/consult to Neurosurgery; Future    2. Lumbar herniated disc    3. Lumbar foraminal stenosis    4. Degeneration of intervertebral disc of lumbar region with discogenic back pain and lower extremity pain    Treatment Plan:    Diagnostics/Referrals:  neurosurgery evaluation    Medications:    NSAIDs:  ibuprofen as needed for pain  Topical Agent: No  TCA/SSRI/SNRI: None  Anti-convulsants:    continue gabapentin to 800 mg 3 times a day, she may call for refill  Muscle  Relaxants: None  Opioids: NA    Interventional Therapy:  may consider lumbar epidural steroid injection at L5-S1  after seeing neurosurgery  Sedation: NA  Anticoagulation medications: None -Clearance to stop Blood thinner: NA    Physical Rehabilitation:  continue with a home exercise    Patient Education: Counseled patient regarding the importance of activity modification, I have stressed the importance of physical activity and a home exercise plan to help with pain and improve health.    Follow-up: RTC  after seeing neurosurgery    May consider:  lumbar epidural steroid injection at L5-S1 versus L4-L5    Mukesh Marie MD  Anesthesiologist  Interventional Pain Medicine  07/03/2025    Disclaimer:  This note was prepared using voice recognition system and is likely to have sound alike errors that may have been overlooked even after proof reading.  Please call me with any questions.

## (undated) DEVICE — GOWN POLY REINF X-LONG XL

## (undated) DEVICE — SOL POVIDONE SCRUB IODINE 4 OZ

## (undated) DEVICE — SEALER LIGASURE LAP 37CM 5MM

## (undated) DEVICE — SOL NORMAL USPCA 0.9%

## (undated) DEVICE — GRASPER EPIX 5X20MM 45CM

## (undated) DEVICE — TOWEL OR DISP STRL BLUE 4/PK

## (undated) DEVICE — DEVICE ANC SW STAT FOLEY 6-24

## (undated) DEVICE — TRAY DO THE ROBOT

## (undated) DEVICE — KIT SURGIFLO HEMOSTATIC MATRIX

## (undated) DEVICE — SET TRI-LUMEN FILTERED TUBE

## (undated) DEVICE — APPLICATOR ENDOSCOPIC

## (undated) DEVICE — UNDERGLOVES BIOGEL PI SZ 7 LF

## (undated) DEVICE — JELLY SURGILUBE 5GR

## (undated) DEVICE — NDL INSUFFLATION VERRES 120MM

## (undated) DEVICE — SOL ELECTROLUBE ANTI-STIC

## (undated) DEVICE — SOL IRRI STRL WATER 1000ML

## (undated) DEVICE — IRRIGATOR ENDOSCOPY DISP.

## (undated) DEVICE — SUT V-LOC 90 GS22 2-0 VIO 23CM

## (undated) DEVICE — DRAPE ARM DAVINCI XI

## (undated) DEVICE — DRAPE COLUMN DAVINCI XI

## (undated) DEVICE — PORT ACCESS 5MM W/120MM

## (undated) DEVICE — SYR 10CC LUER LOCK

## (undated) DEVICE — KIT WING PAD POSITIONING

## (undated) DEVICE — ELECTRODE REM PLYHSV RETURN 9

## (undated) DEVICE — BAG TISSUE RETRIEVAL 5MM

## (undated) DEVICE — ADHESIVE DERMABOND ADVANCED

## (undated) DEVICE — COVER TIP CURVED SCISSORS XI

## (undated) DEVICE — SOL POVIDONE PREP IODINE 4 OZ

## (undated) DEVICE — MANIPULATOR VCARE PLUS 37MM LG

## (undated) DEVICE — INSERT CUSHIONPRONE VIEW LARGE

## (undated) DEVICE — GLOVE BIOGEL SKINSENSE PI 7.0

## (undated) DEVICE — SUT MCRYL PLUS 4-0 PS2 27IN

## (undated) DEVICE — SEAL UNIVERSAL 5MM-8MM XI

## (undated) DEVICE — UNDERGLOVES BIOGEL PI SIZE 8

## (undated) DEVICE — OBTURATOR BLADELESS 8MM XI CLR

## (undated) DEVICE — GLOVE BIOGEL SKINSENSE PI 7.5

## (undated) DEVICE — SYS SEE SHARP SCP ANTIFG LNG